# Patient Record
Sex: FEMALE | Race: WHITE
[De-identification: names, ages, dates, MRNs, and addresses within clinical notes are randomized per-mention and may not be internally consistent; named-entity substitution may affect disease eponyms.]

---

## 2017-09-11 ENCOUNTER — HOSPITAL ENCOUNTER (INPATIENT)
Dept: HOSPITAL 41 - JD.MS | Age: 67
LOS: 2 days | Discharge: HOME | DRG: 470 | End: 2017-09-13
Attending: ORTHOPAEDIC SURGERY | Admitting: ORTHOPAEDIC SURGERY
Payer: MEDICARE

## 2017-09-11 DIAGNOSIS — Z91.030: ICD-10-CM

## 2017-09-11 DIAGNOSIS — Z79.899: ICD-10-CM

## 2017-09-11 DIAGNOSIS — M17.12: Primary | ICD-10-CM

## 2017-09-11 DIAGNOSIS — G47.33: ICD-10-CM

## 2017-09-11 DIAGNOSIS — E66.9: ICD-10-CM

## 2017-09-11 DIAGNOSIS — Z79.82: ICD-10-CM

## 2017-09-11 DIAGNOSIS — E78.5: ICD-10-CM

## 2017-09-11 DIAGNOSIS — Z88.8: ICD-10-CM

## 2017-09-11 DIAGNOSIS — M81.0: ICD-10-CM

## 2017-09-11 DIAGNOSIS — I10: ICD-10-CM

## 2017-09-11 DIAGNOSIS — Z88.1: ICD-10-CM

## 2017-09-11 DIAGNOSIS — F32.9: ICD-10-CM

## 2017-09-11 PROCEDURE — G0009 ADMIN PNEUMOCOCCAL VACCINE: HCPCS

## 2017-09-11 PROCEDURE — C1776 JOINT DEVICE (IMPLANTABLE): HCPCS

## 2017-09-11 PROCEDURE — 0SRD0J9 REPLACEMENT OF LEFT KNEE JOINT WITH SYNTHETIC SUBSTITUTE, CEMENTED, OPEN APPROACH: ICD-10-PCS | Performed by: ORTHOPAEDIC SURGERY

## 2017-09-11 RX ADMIN — BUPIVACAINE HYDROCHLORIDE ONE MG: 2.5 INJECTION, SOLUTION EPIDURAL; INFILTRATION; INTRACAUDAL; PERINEURAL at 13:45

## 2017-09-11 RX ADMIN — BUPIVACAINE HYDROCHLORIDE ONE: 2.5 INJECTION, SOLUTION EPIDURAL; INFILTRATION; INTRACAUDAL; PERINEURAL at 18:21

## 2017-09-11 RX ADMIN — IODINE ONE ML: 20; 20.4; 47 LIQUID TOPICAL at 13:45

## 2017-09-11 RX ADMIN — DEXTROSE ONE GM: 5 SOLUTION INTRAVENOUS at 14:27

## 2017-09-11 RX ADMIN — BUPIVACAINE HYDROCHLORIDE ONE MG: 2.5 INJECTION, SOLUTION EPIDURAL; INFILTRATION; INTRACAUDAL; PERINEURAL at 14:26

## 2017-09-11 RX ADMIN — IODINE ONE ML: 20; 20.4; 47 LIQUID TOPICAL at 14:13

## 2017-09-11 RX ADMIN — OXYCODONE HYDROCHLORIDE AND ACETAMINOPHEN PRN TAB: 5; 325 TABLET ORAL at 21:38

## 2017-09-11 RX ADMIN — DEXTROSE ONE GM: 5 SOLUTION INTRAVENOUS at 13:46

## 2017-09-11 NOTE — PCM.PREANE
Preanesthetic Assessment





- Anesthesia/Transfusion/Family Hx


Anesthesia History: Prior Anesthesia Without Reaction


Type of Anesthesia Reaction: Excessive Nausea/Vomiting


Family History of Anesthesia Reaction: No


Transfusion History: Prior Transfusion Without Reaction





- Review of Systems


General: No Symptoms


Pulmonary: No Symptoms, Other (feels stuffy today)


Cardiovascular: No Symptoms


Gastrointestinal: No Symptoms


Neurological: No Symptoms


Other: Reports: Easy Bruising, Sinus Problem





- Physical Assessment


NPO Status Date: 09/11/17


NPO Status Time: 04:00


O2 Sat by Pulse Oximetry: 95


Respiratory Rate: 20


Vital Signs: 





 Last Vital Signs











Temp  98.3 F   09/11/17 10:50


 


Pulse  97   09/11/17 10:50


 


Resp  20   09/11/17 10:50


 


BP  149/94 H  09/11/17 10:50


 


Pulse Ox  95   09/11/17 10:50











Height: 5 ft 1 in


Weight: 102.104 kg


ASA Class: 2


Mental Status: Alert & Oriented x3


Airway Class: Mallampati = 2


Dentition: Reports: Normal Dentition


Thyro-Mental Finger Breadths: 3


Mouth Opening Finger Breadths: 3


ROM/Head Extension: Full


Lungs: Clear to Auscultation, Normal Respiratory Effort


Cardiovascular: Regular Rate, Regular Rhythm





- Lab


Values: 





 Laboratory Last Values











MRSA (PCR)  Negative   08/29/17  10:42    














- Imaging/EKG


Impressions: 





8/23/17 cxr possibly atelectasis or infiltrate right base -  mild increase in 

heart size 





8/23/17  EKG SR 65





- Allergies


Allergies/Adverse Reactions: 


 Allergies











Allergy/AdvReac Type Severity Reaction Status Date / Time


 


amlodipine [From Norvasc] Allergy  Itching Verified 09/11/17 11:34


 


amoxicillin Allergy  Stomach Verified 09/11/17 11:34





   Upset  


 


bee venom protein (honey bee) Allergy  Edema Verified 09/11/17 11:34


 


erythromycin base Allergy  Stomach Verified 09/11/17 11:34





   Upset  














- Blood


Blood Available: No





- Acknowledgements


Anesthesia Type Planned: Spinal


Pt an Appropriate Candidate for the Planned Anesthesia: Yes


Alternatives and Risks of Anesthesia Discussed w Pt/Guardian: Yes


Pt/Guardian Understands and Agrees with Anesthesia Plan: Yes





PreAnesthesia Questionnaire


HEENT History: Reports: Impaired Vision, Other (See Below)


Other HEENT History: wears glasses


Cardiovascular History: Reports: Heart Murmur, High Cholesterol, Hypertension, 

Other (See Below)


Other Cardiovascular History: mitral valve regurgitation


Respiratory History: Reports: Sleep Apnea


Gastrointestinal History: Reports: GERD


Genitourinary History: Reports: Other (See Below)


Other Genitourinary History: breast hypertrophy, UTI, frequency


OB/GYN History: Reports: None


Musculoskeletal History: Reports: Osteoarthritis, Osteoporosis, Other (See Below

)


Other Musculoskeletal History: degenerative joint disease, degenerative disc 

disease, bilateral shoulder pain, upper back pain


Endocrine/Metabolic History: Reports: None, Obesity/BMI 30+


Hematologic History: Reports: None


Immunologic History: Reports: None


Oncologic (Cancer) History: Reports: None


Dermatologic History: Reports: None





- Past Surgical History


GI Surgical History: Reports: Cholecystectomy, Colonoscopy, EGD


Female  Surgical History: Reports: Breast Biopsy


Endocrine Surgical History: Reports: None


Neurological Surgical History: Reports: None


Musculoskeletal Surgical History: Reports: Other (See Below)


Other Musculoskeletal Surgeries/Procedures:: right total knee, right foot 

fusion with hardware


Oncologic Surgical History: Reports: None





- SUBSTANCE USE


Smoking Status *Q: Never Smoker


Tobacco Use Within Last Twelve Months: No


Second Hand Smoke Exposure: No


Days Per Week of Alcohol Use: 0 (1-2 per month)


Recreational Drug Use History: No





- HOME MEDS


Home Medications: 


 Home Meds





Aspirin/Acetaminophen/Caffeine [Migraine Relief Caplet] 2 tab PO Q6H PRN 09/08/ 17 [History]


Calcium Carbonate [Calcium] 1,200 mg PO DAILY 09/08/17 [History]


Celecoxib 200 mg PO DAILY PRN 09/08/17 [History]


Hydrochlorothiazide [Hydrochlorothiazide] 12.5 mg PO DAILY 09/08/17 [History]


Multivitamin [Daily Kinsey] 1 tab PO DAILY 09/08/17 [History]


Naproxen Sodium [Aleve] 220 mg PO BID 09/08/17 [History]


Omeprazole Magnesium [Prilosec Otc] 20 mg PO DAILY 09/08/17 [History]


Simvastatin [Zocor] 20 mg PO BEDTIME 09/08/17 [History]


traMADol HCl [Tramadol HCl] 50 mg PO QID PRN 09/08/17 [History]


Cholecalciferol (Vitamin D3) [Vitamin D] 1 tab PO DAILY 09/11/17 [History]


Vit C/Kinsey Ac/Lut/Copper/ZnOx [Preservision Lutein Softgel] 1 cap PO BID 09/11/ 17 [History]











- CURRENT (IN HOUSE) MEDS


Current Meds: 





 Current Medications





Aspirin (Ecotrin)  325 mg PO BID [facility]


Bisacodyl (Dulcolax)  5 mg PO DAILY PRN


   PRN Reason: Constipation


Morphine Sulfate 8 mg/Epinephrine HCl 0.3 mg/Cefuroxime Sodium 750 mg/Ketorolac 

Tromethamine 30 mg/Sodium Chloride 27.9 ml  0 mg .XX ONETIME ONE


   Stop: 09/11/17 12:01


Cyclobenzaprine HCl (Flexeril)  10 mg PO TID PRN


   PRN Reason: Spasms


Diphenhydramine HCl (Benadryl)  25 mg IVPUSH Q4H PRN


   PRN Reason: Nausea


Docusate Sodium (Colace)  100 mg PO BID [facility]


Famotidine (Pepcid)  20 mg PO Q12H [facility]


Lactated Ringer's (Ringers, Lactated)  1,000 mls @ 125 mls/hr IV ASDIRECTED [facility]


   Last Admin: 09/11/17 11:10 Dose:  125 mls/hr


Cefazolin Sodium/Dextrose 2 gm (/ Premix)  50 mls @ 100 mls/hr IV Q8H [facility]


   Stop: 09/11/17 23:59


Ketorolac Tromethamine (Toradol)  15 mg IVPUSH Q8H PRN


   PRN Reason: Pain


Lidocaine/Sodium Bicarbonate (Buffered Lidocaine 1% In Ns 8.4%)  0.25 ml IV 

ONETIME PRN


   PRN Reason: Prior to IV Start


   Stop: 09/11/17 23:00


   Last Admin: 09/11/17 11:09 Dose:  0.25 ml


Magnesium Hydroxide (Milk Of Magnesia)  30 ml PO BID PRN


   PRN Reason: Constipation


Morphine Sulfate (Morphine)  2 mg IVPUSH Q2H PRN


   PRN Reason: Breakthrough Pain


Naloxone HCl (Narcan)  0.1 mg IVPUSH Q5M PRN


   PRN Reason: Oversedation


Ondansetron HCl (Zofran)  4 mg IVPUSH Q6H PRN


   PRN Reason: Nausea/Vomiting


Oxycodone/Acetaminophen (Percocet 325-5 Mg)  1 - 2 tab PO Q4H PRN


   PRN Reason: Pain


Senna (Senna)  8.6 mg PO BID PRN


   PRN Reason: Constipation


Sodium Chloride (Saline Flush)  10 ml FLUSH ASDIRECTED PRN


   PRN Reason: Keep Vein Open


   Stop: 09/11/17 23:00





Discontinued Medications





Cefazolin Sodium (Ancef) Confirm Administered Dose 2 gm .ROUTE .STK-MED ONE


   Stop: 09/11/17 11:23


Ephedrine Sulfate (Ephedrine Sulfate) Confirm Administered Dose 50 mg .ROUTE 

.STK-MED ONE


   Stop: 09/11/17 11:23


Fentanyl (Sublimaze) Confirm Administered Dose 100 mcg .ROUTE .STK-MED ONE


   Stop: 09/11/17 11:24


Lidocaine HCl (Xylocaine-Mpf 1%) Confirm Administered Dose 4 mls @ as directed 

.ROUTE .STK-MED ONE


   Stop: 09/11/17 11:23


Midazolam HCl (Versed 1 Mg/Ml) Confirm Administered Dose 2 mg .ROUTE .STK-MED 

ONE


   Stop: 09/11/17 11:24


Morphine Sulfate (Duramorph Pf) Confirm Administered Dose 10 mg .ROUTE .STK-MED 

ONE


   Stop: 09/11/17 11:24


Propofol (Diprivan  20 Ml) Confirm Administered Dose 400 mg .ROUTE .STK-MED ONE


   Stop: 09/11/17 11:24

## 2017-09-11 NOTE — PCM.POSTAN
POST ANESTHESIA ASSESSMENT





- MENTAL STATUS


Mental Status: Alert, Oriented





- VITAL SIGNS


Pulse Rate: 93


SaO2: 93


Resp Rate: 12


Blood Pressure: 121/71


Temperature: 98.2 C





- RESPIRATORY


Respiratory Status: Respiratory Rate WNL, Airway Patent, O2 Saturation Stable, 

Supplemental Oxygen





- CARDIOVASCULAR


CV Status: Pulse Rate WNL, Blood Pressure Stable





- GASTROINTESTINAL


GI Status: No Symptoms





- PAIN


Pain Score: 0





- POST OP HYDRATION


Hydration Status: Adequate & Stable

## 2017-09-11 NOTE — PCM.CONSN
- General Info


Date of Service: 09/11/17


Subjective Update: 











67 year old female with left knee OA presents post op.  She is S/P left total 

knee arthroplasty.  The hospitalist service has been consulted to assist with 

medical management.


Functional Status: Reports: Pain Controlled, Tolerating Diet, Ambulating





- Review of Systems


General: Reports: No Symptoms


HEENT: Reports: No Symptoms


Pulmonary: Reports: No Symptoms


Cardiovascular: Reports: No Symptoms


Gastrointestinal: Reports: No Symptoms


Genitourinary: Reports: No Symptoms


Musculoskeletal: Reports: No Symptoms


Skin: Reports: No Symptoms


Neurological: Reports: No Symptoms


Psychiatric: Reports: No Symptoms





- Patient Data


Vitals - Most Recent: 


 Last Vital Signs











Temp  36.6 C   09/11/17 18:00


 


Pulse  88   09/11/17 19:05


 


Resp  16   09/11/17 19:05


 


BP  120/75   09/11/17 19:05


 


Pulse Ox  92 L  09/11/17 19:05











Weight - Most Recent: 102.104 kg


I&O - Last 24 Hours: 


 Intake & Output











 09/11/17 09/11/17 09/11/17





 06:59 14:59 22:59


 


Intake Total   340


 


Output Total   500


 


Balance   -160











Med Orders - Current: 


 Current Medications





Aspirin (Ecotrin)  325 mg PO BID [facility]


Bisacodyl (Dulcolax)  5 mg PO DAILY PRN


   PRN Reason: Constipation


Calcium Carbonate/Glycine (Calcium Carbonate)  1,200 mg PO DAILY [facility]


Cholecalciferol (Vitamin D3)  5,000 units PO DAILY [facility]


Cyclobenzaprine HCl (Flexeril)  10 mg PO TID PRN


   PRN Reason: Spasms


Docusate Sodium (Colace)  100 mg PO BID [facility]


Hydrochlorothiazide (Hydrochlorothiazide)  12.5 mg PO DAILY [facility]


Cefazolin Sodium/Dextrose 2 gm (/ Premix)  50 mls @ 100 mls/hr IV Q8H [facility]


   Stop: 09/12/17 13:29


Ketorolac Tromethamine (Toradol)  15 mg IVPUSH Q8H PRN


   PRN Reason: Pain


Magnesium Hydroxide (Milk Of Magnesia)  30 ml PO BID PRN


   PRN Reason: Constipation


Miscellaneous Information (Remove Patch)  0 ea TRDERM ONETIME ONE


   Stop: 09/14/17 12:31


Morphine Sulfate (Morphine)  2 mg IVPUSH Q2H PRN


   PRN Reason: Breakthrough Pain


Naloxone HCl (Narcan)  0.1 mg IVPUSH Q5M PRN


   PRN Reason: Oversedation


Ondansetron HCl (Zofran)  4 mg IVPUSH Q6H PRN


   PRN Reason: Nausea/Vomiting


Oxycodone/Acetaminophen (Percocet 325-5 Mg)  1 - 2 tab PO Q4H PRN


   PRN Reason: Pain


Pantoprazole Sodium (Protonix***)  40 mg PO DAILY [facility]


Senna (Senna)  8.6 mg PO BID PRN


   PRN Reason: Constipation


Simvastatin (Zocor)  20 mg PO BEDTIME [facility]


Sodium Chloride (Saline Flush)  10 ml FLUSH ASDIRECTED PRN


   PRN Reason: Keep Vein Open


   Stop: 09/11/17 23:00





Discontinued Medications





Bupivacaine HCl (Marcaine 0.25%) Confirm Administered Dose 30 ml .ROUTE .STK-

MED ONE


   Stop: 09/11/17 12:20


   Last Admin: 09/11/17 14:32 Dose:  30 ml


Cefazolin Sodium (Ancef) Confirm Administered Dose 2 gm .ROUTE .STK-MED ONE


   Stop: 09/11/17 11:23


   Last Admin: 09/11/17 14:17 Dose:  2 gm


Cefazolin Sodium (Ancef) Confirm Administered Dose 2 gm .ROUTE .STK-MED ONE


   Stop: 09/11/17 12:20


Morphine Sulfate 8 mg/Epinephrine HCl 0.3 mg/Cefuroxime Sodium 750 mg/Ketorolac 

Tromethamine 30 mg/Sodium Chloride 27.9 ml  0 mg .XX ONETIME ONE


   Stop: 09/11/17 12:01


   Last Admin: 09/11/17 18:21 Dose:  Not Given


Diphenhydramine HCl (Benadryl)  25 mg IVPUSH Q4H PRN


   PRN Reason: Nausea


Diphenhydramine HCl (Benadryl)  25 mg IVPUSH Q6H PRN


   PRN Reason: Pruritis


   Stop: 09/11/17 18:00


Ephedrine Sulfate (Ephedrine Sulfate) Confirm Administered Dose 50 mg .ROUTE 

.STK-MED ONE


   Stop: 09/11/17 11:23


Famotidine (Pepcid)  20 mg PO Q12H [facility]


Famotidine (Pepcid) Confirm Administered Dose 20 mg .ROUTE .STK-MED ONE


   Stop: 09/11/17 12:15


Fentanyl (Sublimaze) Confirm Administered Dose 100 mcg .ROUTE .STK-MED ONE


   Stop: 09/11/17 11:24


Haloperidol Lactate (Haldol)  1 mg IVPUSH ONETIME ONE


   Stop: 09/11/17 14:16


   Last Admin: 09/11/17 18:56 Dose:  Not Given


Lactated Ringer's (Ringers, Lactated)  1,000 mls @ 125 mls/hr IV ASDIRECTED [facility]


   Last Admin: 09/11/17 11:10 Dose:  125 mls/hr


Lidocaine HCl (Xylocaine-Mpf 1%) Confirm Administered Dose 4 mls @ as directed 

.ROUTE .STK-MED ONE


   Stop: 09/11/17 11:23


Iodine (Iodine 2% Mild Tincture) Confirm Administered Dose 30 ml .ROUTE .STK-

MED ONE


   Stop: 09/11/17 12:20


   Last Admin: 09/11/17 14:13 Dose:  18 ml


Lidocaine/Sodium Bicarbonate (Buffered Lidocaine 1% In Ns 8.4%)  0.25 ml IV 

ONETIME PRN


   PRN Reason: Prior to IV Start


   Stop: 09/11/17 23:00


   Last Admin: 09/11/17 11:09 Dose:  0.25 ml


Midazolam HCl (Versed 1 Mg/Ml) Confirm Administered Dose 2 mg .ROUTE .STK-MED 

ONE


   Stop: 09/11/17 11:24


Midazolam HCl (Versed 1 Mg/Ml) Confirm Administered Dose 2 mg .ROUTE .STK-MED 

ONE


   Stop: 09/11/17 13:01


Morphine Sulfate (Duramorph Pf) Confirm Administered Dose 10 mg .ROUTE .STK-MED 

ONE


   Stop: 09/11/17 11:24


Ondansetron HCl (Zofran)  4 mg IVPUSH ONETIME PRN


   PRN Reason: Nausea/Vomiting


   Stop: 09/11/17 18:00


Propofol (Diprivan  20 Ml) Confirm Administered Dose 400 mg .ROUTE .STK-MED ONE


   Stop: 09/11/17 11:24


Propofol (Diprivan  20 Ml) Confirm Administered Dose 400 mg .ROUTE .STK-MED ONE


   Stop: 09/11/17 14:10


Scopolamine (Transderm-Scop)  1.5 mg TRDERM ONETIME ONE


   Stop: 09/11/17 12:31


   Last Admin: 09/11/17 12:30 Dose:  1.5 mg


Tranexamic Acid (Cyklokapron) Confirm Administered Dose 1,000 mg .ROUTE .STK-

MED ONE


   Stop: 09/11/17 12:20


   Last Admin: 09/11/17 14:31 Dose:  1,000 mg


Vancomycin HCl (Vancomycin) Confirm Administered Dose 1 gm .ROUTE .STK-MED ONE


   Stop: 09/11/17 12:20


   Last Admin: 09/11/17 14:27 Dose:  1 gm











- Exam


Quality Assessment: Supplemental Oxygen, Urine Catheter, DVT Prophylaxis


General: Alert, Oriented, Cooperative, No Acute Distress


HEENT: Pupils Equal, Pupils Reactive, EOMI


Neck: Supple, Trachea Midline, No JVD


Lungs: Normal Respiratory Effort


Cardiovascular: Regular Rate, Regular Rhythm, Murmurs (SM at apex)


GI/Abdominal Exam: Normal Bowel Sounds, Soft, Non-Tender, No Distention


 (Female) Exam: Deferred


Back Exam: Normal Inspection


Extremities: Normal Inspection, Normal Capillary Refill


Skin: Warm


Wound/Incisions: Dressing Dry and Intact


Neurological: No New Focal Deficit


Psy/Mental Status: Alert, Normal Affect, Normal Mood





Consult PN Assessment/Plan


POD#: 0


Procedures: 


Procedures





BONE IMAGING 3 PHASE (07/13/17)


MICROBE SUSCEPTIBLE SUAD (06/03/17)


URINE BACTERIA CULTURE (06/03/17)


URINE CULTURE/COLONY COUNT (06/03/17)


X-RAY EXAM OF WRIST (06/04/14)








Problem List Initiated/Reviewed/Updated: Yes


Plan: 











Impression:





Left knee arthroplasty, POD 0

















Chronic


Depression


OA


HLD


MR


Sleep apnea

















Plan:





Home meds


Daily labs


Pain/DVT medical mgt per primary service


CM/PT/OT

## 2017-09-12 RX ADMIN — OXYCODONE HYDROCHLORIDE AND ACETAMINOPHEN PRN TAB: 5; 325 TABLET ORAL at 22:32

## 2017-09-12 RX ADMIN — VITAMIN D, TAB 1000IU (100/BT) SCH UNITS: 25 TAB at 08:21

## 2017-09-12 RX ADMIN — OXYCODONE HYDROCHLORIDE AND ACETAMINOPHEN PRN TAB: 5; 325 TABLET ORAL at 18:48

## 2017-09-12 RX ADMIN — OXYCODONE HYDROCHLORIDE AND ACETAMINOPHEN PRN TAB: 5; 325 TABLET ORAL at 03:59

## 2017-09-12 RX ADMIN — KETOROLAC TROMETHAMINE PRN MG: 15 INJECTION, SOLUTION INTRAMUSCULAR; INTRAVENOUS at 21:57

## 2017-09-12 RX ADMIN — OXYCODONE HYDROCHLORIDE AND ACETAMINOPHEN PRN TAB: 5; 325 TABLET ORAL at 12:57

## 2017-09-12 RX ADMIN — ASPIRIN SCH MG: 325 TABLET, DELAYED RELEASE ORAL at 08:22

## 2017-09-12 RX ADMIN — ASPIRIN SCH MG: 325 TABLET, DELAYED RELEASE ORAL at 21:58

## 2017-09-12 RX ADMIN — OXYCODONE HYDROCHLORIDE AND ACETAMINOPHEN PRN TAB: 5; 325 TABLET ORAL at 08:23

## 2017-09-12 RX ADMIN — SODIUM CHLORIDE PRN MG: 9 INJECTION, SOLUTION INTRAVENOUS at 12:08

## 2017-09-12 RX ADMIN — KETOROLAC TROMETHAMINE PRN MG: 15 INJECTION, SOLUTION INTRAMUSCULAR; INTRAVENOUS at 08:24

## 2017-09-12 NOTE — [id number]
- General Info


Date of Service: 09/12/17


POD#: 1


Functional Status: Reports: Tolerating Diet, Ambulating, Urinating, Other (The 

pt has had low O2 sats intermittently today.  She has had nausea.)





- Review of Systems


Musculoskeletal: Reports: Other (The pt is progressing with therapies.)





- Patient Data


Vitals - Most Recent: 


 Last Vital Signs











Temp  98.4 F   09/12/17 12:09


 


Pulse  72   09/12/17 12:09


 


Resp  16   09/12/17 15:00


 


BP  114/66   09/12/17 12:09


 


Pulse Ox  97   09/12/17 15:00











Weight - Most Recent: 224 lb


I&O - Last 24 Hours: 


 Intake & Output











 09/12/17 09/12/17 09/12/17





 06:59 14:59 22:59


 


Intake Total 1300 120 430


 


Output Total 575  


 


Balance 725 120 430











Lab Results Last 24 Hrs: 


 Laboratory Results - last 24 hr











  09/12/17 09/12/17 Range/Units





  06:01 06:01 


 


WBC  12.33 H   (3.98-10.04)  K/mm3


 


RBC  3.43 L   (3.98-5.22)  M/mm3


 


Hgb  10.3 L   (11.2-15.7)  gm/L


 


Hct  32.1 L   (34.1-44.9)  %


 


MCV  93.6   (79.4-94.8)  fl


 


MCH  30.0   (25.6-32.2)  pg


 


MCHC  32.1 L   (32.2-35.5)  g/dl


 


RDW Std Deviation  45.5   (36.4-46.3)  fL


 


Plt Count  211   (182-369)  K/mm3


 


MPV  12.0   (9.4-12.3)  fl


 


Sodium   139  (136-145)  mEq/L


 


Potassium   4.1  (3.5-5.1)  mEq/L


 


Chloride   103  ()  mEq/L


 


Carbon Dioxide   29  (21-32)  mEq/L


 


Anion Gap   11.1  (5-15)  


 


BUN   13  (7-18)  mg/dL


 


Creatinine   0.8  (0.55-1.02)  mg/dL


 


Est Cr Clr Drug Dosing   50.79  mL/min


 


Estimated GFR (MDRD)   > 60  (>60)  mL/min


 


BUN/Creatinine Ratio   16.3  (14-18)  


 


Glucose   118 H  ()  mg/dL


 


Calcium   8.0 L  (8.5-10.1)  mg/dL


 


Total Bilirubin   0.9  (0.2-1.0)  mg/dL


 


AST   21  (15-37)  U/L


 


ALT   24  (14-59)  U/L


 


Alkaline Phosphatase   53  ()  U/L


 


Total Protein   5.9 L  (6.4-8.2)  g/dl


 


Albumin   2.8 L  (3.4-5.0)  g/dl


 


Globulin   3.1  gm/dL


 


Albumin/Globulin Ratio   0.9 L  (1-2)  











Med Orders - Current: 


 Current Medications





Aspirin (Ecotrin)  325 mg PO BID [facility]


   Last Admin: 09/12/17 08:22 Dose:  325 mg


Bisacodyl (Dulcolax)  5 mg PO DAILY PRN


   PRN Reason: Constipation


Calcium Carbonate/Glycine (Calcium Carbonate)  1,200 mg PO DAILY [facility]


   Last Admin: 09/12/17 08:22 Dose:  600 mg


Cholecalciferol (Vitamin D3)  5,000 units PO DAILY [facility]


   Last Admin: 09/12/17 08:21 Dose:  5,000 units


Cyclobenzaprine HCl (Flexeril)  10 mg PO TID PRN


   PRN Reason: Spasms


Docusate Sodium (Colace)  100 mg PO BID [facility]


   Last Admin: 09/12/17 08:23 Dose:  100 mg


Hydrochlorothiazide (Hydrochlorothiazide)  12.5 mg PO DAILY [facility]


   Last Admin: 09/12/17 08:22 Dose:  12.5 mg


Ketorolac Tromethamine (Toradol)  15 mg IVPUSH Q8H PRN


   PRN Reason: Pain


   Last Admin: 09/12/17 08:24 Dose:  15 mg


Magnesium Hydroxide (Milk Of Magnesia)  30 ml PO BID PRN


   PRN Reason: Constipation


Miscellaneous Information (Remove Patch)  0 ea TRDERM ONETIME ONE


   Stop: 09/14/17 12:31


Morphine Sulfate (Morphine)  2 mg IVPUSH Q2H PRN


   PRN Reason: Breakthrough Pain


   Last Admin: 09/12/17 16:16 Dose:  2 mg


Naloxone HCl (Narcan)  0.1 mg IVPUSH Q5M PRN


   PRN Reason: Oversedation


Ondansetron HCl (Zofran)  4 mg IVPUSH Q6H PRN


   PRN Reason: Nausea/Vomiting


   Last Admin: 09/12/17 12:08 Dose:  4 mg


Oxycodone/Acetaminophen (Percocet 325-5 Mg)  1 - 2 tab PO Q4H PRN


   PRN Reason: Pain


   Last Admin: 09/12/17 12:57 Dose:  2 tab


Pantoprazole Sodium (Protonix***)  40 mg PO DAILY [facility]


   Last Admin: 09/12/17 08:22 Dose:  40 mg


Senna (Senna)  8.6 mg PO BID PRN


   PRN Reason: Constipation


Simvastatin (Zocor)  20 mg PO BEDTIME [facility]


   Last Admin: 09/11/17 20:05 Dose:  20 mg





Discontinued Medications





Bupivacaine HCl (Marcaine 0.25%) Confirm Administered Dose 30 ml .ROUTE .STK-

MED ONE


   Stop: 09/11/17 12:20


   Last Admin: 09/11/17 14:32 Dose:  30 ml


Cefazolin Sodium (Ancef) Confirm Administered Dose 2 gm .ROUTE .STK-MED ONE


   Stop: 09/11/17 11:23


   Last Admin: 09/11/17 14:17 Dose:  2 gm


Cefazolin Sodium (Ancef) Confirm Administered Dose 2 gm .ROUTE .STK-MED ONE


   Stop: 09/11/17 12:20


Morphine Sulfate 8 mg/Epinephrine HCl 0.3 mg/Cefuroxime Sodium 750 mg/Ketorolac 

Tromethamine 30 mg/Sodium Chloride 27.9 ml  0 mg .XX ONETIME ONE


   Stop: 09/11/17 12:01


   Last Admin: 09/11/17 18:21 Dose:  Not Given


Diphenhydramine HCl (Benadryl)  25 mg IVPUSH Q4H PRN


   PRN Reason: Nausea


Diphenhydramine HCl (Benadryl)  25 mg IVPUSH Q6H PRN


   PRN Reason: Pruritis


   Stop: 09/11/17 18:00


Diphenhydramine HCl (Benadryl)  25 mg PO ONETIME ONE


   Stop: 09/11/17 22:31


   Last Admin: 09/11/17 22:39 Dose:  25 mg


Ephedrine Sulfate (Ephedrine Sulfate) Confirm Administered Dose 50 mg .ROUTE 

.STK-MED ONE


   Stop: 09/11/17 11:23


Famotidine (Pepcid)  20 mg PO Q12H [facility]


Famotidine (Pepcid) Confirm Administered Dose 20 mg .ROUTE .STK-MED ONE


   Stop: 09/11/17 12:15


Fentanyl (Sublimaze) Confirm Administered Dose 100 mcg .ROUTE .STK-MED ONE


   Stop: 09/11/17 11:24


Haloperidol Lactate (Haldol)  1 mg IVPUSH ONETIME ONE


   Stop: 09/11/17 14:16


   Last Admin: 09/11/17 18:56 Dose:  Not Given


Lactated Ringer's (Ringers, Lactated)  1,000 mls @ 125 mls/hr IV ASDIRECTED [facility]


   Last Admin: 09/11/17 11:10 Dose:  125 mls/hr


Cefazolin Sodium/Dextrose 2 gm (/ Premix)  50 mls @ 100 mls/hr IV Q8H [facility]


   Stop: 09/12/17 13:29


   Last Admin: 09/12/17 12:08 Dose:  100 mls/hr


Lidocaine HCl (Xylocaine-Mpf 1%) Confirm Administered Dose 4 mls @ as directed 

.ROUTE .STK-MED ONE


   Stop: 09/11/17 11:23


Iodine (Iodine 2% Mild Tincture) Confirm Administered Dose 30 ml .ROUTE .STK-

MED ONE


   Stop: 09/11/17 12:20


   Last Admin: 09/11/17 14:13 Dose:  18 ml


Lidocaine/Sodium Bicarbonate (Buffered Lidocaine 1% In Ns 8.4%)  0.25 ml IV 

ONETIME PRN


   PRN Reason: Prior to IV Start


   Stop: 09/11/17 23:00


   Last Admin: 09/11/17 11:09 Dose:  0.25 ml


Midazolam HCl (Versed 1 Mg/Ml) Confirm Administered Dose 2 mg .ROUTE .STK-MED 

ONE


   Stop: 09/11/17 11:24


Midazolam HCl (Versed 1 Mg/Ml) Confirm Administered Dose 2 mg .ROUTE .STK-MED 

ONE


   Stop: 09/11/17 13:01


Morphine Sulfate (Duramorph Pf) Confirm Administered Dose 10 mg .ROUTE .STK-MED 

ONE


   Stop: 09/11/17 11:24


Ondansetron HCl (Zofran)  4 mg IVPUSH ONETIME PRN


   PRN Reason: Nausea/Vomiting


   Stop: 09/11/17 18:00


Propofol (Diprivan  20 Ml) Confirm Administered Dose 400 mg .ROUTE .STK-MED ONE


   Stop: 09/11/17 11:24


Propofol (Diprivan  20 Ml) Confirm Administered Dose 400 mg .ROUTE .STK-MED ONE


   Stop: 09/11/17 14:10


Scopolamine (Transderm-Scop)  1.5 mg TRDERM ONETIME ONE


   Stop: 09/11/17 12:31


   Last Admin: 09/11/17 12:30 Dose:  1.5 mg


Sodium Chloride (Saline Flush)  10 ml FLUSH ASDIRECTED PRN


   PRN Reason: Keep Vein Open


   Stop: 09/11/17 23:00


Tranexamic Acid (Cyklokapron) Confirm Administered Dose 1,000 mg .ROUTE .STK-

MED ONE


   Stop: 09/11/17 12:20


   Last Admin: 09/11/17 14:31 Dose:  1,000 mg


Vancomycin HCl (Vancomycin) Confirm Administered Dose 1 gm .ROUTE .STK-MED ONE


   Stop: 09/11/17 12:20


   Last Admin: 09/11/17 14:27 Dose:  1 gm











- Exam


Wound/Incisions: Dressing Dry and Intact


General: Alert, Cooperative, No Acute Distress


Lungs: Normal Respiratory Effort, Other (O2 per NC)


Extremities: Other (NVS intact for BLE.  Andrea's negative.  )





- Problem List Review


Problem List Initiated/Reviewed/Updated: Yes





- My Orders


Last 24 Hours: 


 Active Orders 24 hr











 Category Date Time Status


 


 Acapella [RT Chest Physiotherapy] [RC] ASDIRECTED Care  09/12/17 09:09 Active


 


 Evaluate for Home Oxygen [RT Evaluate for Home Oxygen] Care  09/12/17 11:00 

Active





 [RC] Click to Edit   


 


 Regular Diet [DIET] Diet  09/11/17 Dinner Active


 


 Aspirin [Ecotrin] Med  09/12/17 09:00 Active





 325 mg PO BID   


 


 Calcium Carbonate Med  09/12/17 09:00 Active





 1,200 mg PO DAILY   


 


 Cholecalciferol (Vitamin D3) [Vitamin D3] Med  09/12/17 09:00 Active





 5,000 units PO DAILY   


 


 Docusate Sodium [Colace] Med  09/11/17 21:00 Active





 100 mg PO BID   


 


 Hydrochlorothiazide Med  09/12/17 09:00 Active





 12.5 mg PO DAILY   


 


 Pantoprazole [ProTONIX***] Med  09/12/17 09:00 Active





 40 mg PO DAILY   


 


 Remove Patch Med  09/14/17 12:30 Once





 0 ea TRDERM ONETIME ONE   


 


 Simvastatin [Zocor] Med  09/11/17 21:00 Active





 20 mg PO BEDTIME   








 Medication Orders





Aspirin (Ecotrin)  325 mg PO BID [facility]


   Last Admin: 09/12/17 08:22  Dose: 325 mg


Bisacodyl (Dulcolax)  5 mg PO DAILY PRN


   PRN Reason: Constipation


Calcium Carbonate/Glycine (Calcium Carbonate)  1,200 mg PO DAILY [facility]


   Last Admin: 09/12/17 08:22  Dose: 600 mg


Cholecalciferol (Vitamin D3)  5,000 units PO DAILY [facility]


   Last Admin: 09/12/17 08:21  Dose: 5,000 units


Cyclobenzaprine HCl (Flexeril)  10 mg PO TID PRN


   PRN Reason: Spasms


Docusate Sodium (Colace)  100 mg PO BID [facility]


   Last Admin: 09/12/17 08:23  Dose: 100 mg


   Admin: 09/11/17 20:05  Dose: 100 mg


Hydrochlorothiazide (Hydrochlorothiazide)  12.5 mg PO DAILY [facility]


   Last Admin: 09/12/17 08:22  Dose: 12.5 mg


Ketorolac Tromethamine (Toradol)  15 mg IVPUSH Q8H PRN


   PRN Reason: Pain


   Last Admin: 09/12/17 08:24  Dose: 15 mg


Magnesium Hydroxide (Milk Of Magnesia)  30 ml PO BID PRN


   PRN Reason: Constipation


Miscellaneous Information (Remove Patch)  0 ea TRDERM ONETIME ONE


   Stop: 09/14/17 12:31


Morphine Sulfate (Morphine)  2 mg IVPUSH Q2H PRN


   PRN Reason: Breakthrough Pain


   Last Admin: 09/12/17 16:16  Dose: 2 mg


Naloxone HCl (Narcan)  0.1 mg IVPUSH Q5M PRN


   PRN Reason: Oversedation


Ondansetron HCl (Zofran)  4 mg IVPUSH Q6H PRN


   PRN Reason: Nausea/Vomiting


   Last Admin: 09/12/17 12:08  Dose: 4 mg


Oxycodone/Acetaminophen (Percocet 325-5 Mg)  1 - 2 tab PO Q4H PRN


   PRN Reason: Pain


   Last Admin: 09/12/17 12:57  Dose: 2 tab


   Admin: 09/12/17 08:23  Dose: 2 tab


   Admin: 09/12/17 03:59  Dose: 2 tab


   Admin: 09/11/17 21:38  Dose: 2 tab


Pantoprazole Sodium (Protonix***)  40 mg PO DAILY [facility]


   Last Admin: 09/12/17 08:22  Dose: 40 mg


Senna (Senna)  8.6 mg PO BID PRN


   PRN Reason: Constipation


Simvastatin (Zocor)  20 mg PO BEDTIME [facility]


   Last Admin: 09/11/17 20:05  Dose: 20 mg











- Assessment


Assessment           (Free Text/Narrative):: 





POD#1 - left TKA





- Plan


Plan                        (Free Text/Narrative):: 





1.  The pt will remain in Hospital overnight for continued monitoring of oxygen 

and to try to wean from O2 use.


2.  The pt has not been cleared medically for discharge today.


3.  Hgb 10.3 today.


4.  ASA 325mg PO BID, frequent mobility, SCDs, TEDs for VTE prophylaxis.





The pt's case was discussed with Dr. Perales today.

## 2017-09-12 NOTE — [id number]
Post Anesthesia Note





- EVALUATION WITHIN 48HRS OF ANESTHETIC


Vital Signs in Normal Range: Yes


Patient Participated in Evaluation: Yes


Respiratory Function Stable: Yes


Airway Patent: Yes


Cardiovascular Function Stable: Yes


Hydration Status Stable: Yes


Pain Control Satisfactory: No


Nausea and Vomiting Control Satisfactory: Yes


Mental Status Recovered: Yes





- COMMENTS/OBSERVATIONS


Free Text/Narrative:: 





Patient denied any residual numbness or tingling to lower extremities, headache

, or back pain.  Patient also said she didn't have any nausea but did complain 

of pain to her left knee.  Pt is up ambulating and resting in her chair.

## 2017-09-12 NOTE — PCM.SN
- Free Text/Narrative


Note: 


Patient was cleared for discharge from an Ortho standpoint. Her oxygen 

saturations have fluctuated quite a bit today while in our care. I was in to 

see her this AM after nursing attempted to wean pt. off O2. Her saturations 

dropped to middle 80's with good pleth on continuous pulse ox. PT/OT reports 

concerns with patient as she was de-saturating for them while they were 

evaluating her. There were some concerns over her saturations by nursing as well

, although her documented saturations appear to reflect baseline. I personally 

went in to evaluate patient. O2 was stopped, as she was showing 97% saturation 

on the monitor with good pleth at 1L via NC. Laying in bed, not moving or 

talking, she dropped to low 90's, fluctuating between 90-93. While talking she 

de-saturated to 85-87 range. She has a diagnosis of MILI, but no other 

respiratory conditions. She is obese. She has never been a smoker. Her 

saturations eventually returned to low 90's sitting around 92%, however they do 

fluctuate occasionally. I discussed these findings with the patient. She voices 

concern, as she reportedly was hospitalized for an extended period of time 

following a prior surgery. We discussed exploring home oxygen therapy if needed 

and she would rather not be discharged on home O2, if at all possible. Case 

management reports pt. does not have a diagnosis that would authorize home 

oxygen equipment. We will explore this thoroughly if it comes to this.  RT will 

attempt to qualify her for home O2 tonight so we can be prepared and determine 

need. At this point I do not think Mrs. Kidd is medically ready to be 

discharged. We will evaluate her thoroughly tomorrow utilizing nursing, along 

with PT/OT, and plan on discharge at that point.

## 2017-09-12 NOTE — CR
Left knee: Two views of the left knee were obtained.

 

Comparison: No prior left knee study.

 

Knee prosthesis is seen.  Components are aligned.  Soft tissue air is 

noted from the surgical procedure.  Bony structures are unremarkable.

 

Impression:

1.  Satisfactory postop radiographic appearance of recently placed 

left knee prosthesis.

 

Diagnostic code #2

## 2017-09-12 NOTE — PCM.CONSN
- General Info


Date of Service: 09/12/17


Admission Dx/Problem (Free Text): 


S/P left knee arthroplasty 





Subjective Update: 


67 year old female with left knee OA presents post op.  She is day 1 S/P left 

total knee arthroplasty.  The hospitalist service has been consulted to assist 

with medical management.


Functional Status: Reports: Pain Controlled, Tolerating Diet, Ambulating, 

Incentive Spirometry


Pain Score: 3





- Review of Systems


General: Reports: No Symptoms


HEENT: Reports: No Symptoms


Pulmonary: Reports: Cough (since surgery ).  Denies: Shortness of Breath, 

Pleuritic Chest Pain, Sputum, Wheezing


Cardiovascular: Reports: No Symptoms


Gastrointestinal: Reports: No Symptoms


Genitourinary: Reports: No Symptoms


Musculoskeletal: Reports: Leg Pain (left leg from knee to hip)


Skin: Reports: No Symptoms


Neurological: Reports: No Symptoms


Psychiatric: Reports: No Symptoms





- Patient Data


Vitals - Most Recent: 


 Last Vital Signs











Temp  98.6 F   09/12/17 08:14


 


Pulse  71   09/12/17 08:14


 


Resp  17   09/12/17 09:00


 


BP  102/67   09/12/17 08:14


 


Pulse Ox  97   09/12/17 07:50











Weight - Most Recent: 224 lb


I&O - Last 24 Hours: 


 Intake & Output











 09/11/17 09/12/17 09/12/17





 22:59 06:59 14:59


 


Intake Total 1350 1300 


 


Output Total 600 575 


 


Balance 750 725 











Lab Results Last 24 Hours: 


 Laboratory Results - last 24 hr











  09/12/17 09/12/17 Range/Units





  06:01 06:01 


 


WBC  12.33 H   (3.98-10.04)  K/mm3


 


RBC  3.43 L   (3.98-5.22)  M/mm3


 


Hgb  10.3 L   (11.2-15.7)  gm/L


 


Hct  32.1 L   (34.1-44.9)  %


 


MCV  93.6   (79.4-94.8)  fl


 


MCH  30.0   (25.6-32.2)  pg


 


MCHC  32.1 L   (32.2-35.5)  g/dl


 


RDW Std Deviation  45.5   (36.4-46.3)  fL


 


Plt Count  211   (182-369)  K/mm3


 


MPV  12.0   (9.4-12.3)  fl


 


Sodium   139  (136-145)  mEq/L


 


Potassium   4.1  (3.5-5.1)  mEq/L


 


Chloride   103  ()  mEq/L


 


Carbon Dioxide   29  (21-32)  mEq/L


 


Anion Gap   11.1  (5-15)  


 


BUN   13  (7-18)  mg/dL


 


Creatinine   0.8  (0.55-1.02)  mg/dL


 


Est Cr Clr Drug Dosing   50.79  mL/min


 


Estimated GFR (MDRD)   > 60  (>60)  mL/min


 


BUN/Creatinine Ratio   16.3  (14-18)  


 


Glucose   118 H  ()  mg/dL


 


Calcium   8.0 L  (8.5-10.1)  mg/dL


 


Total Bilirubin   0.9  (0.2-1.0)  mg/dL


 


AST   21  (15-37)  U/L


 


ALT   24  (14-59)  U/L


 


Alkaline Phosphatase   53  ()  U/L


 


Total Protein   5.9 L  (6.4-8.2)  g/dl


 


Albumin   2.8 L  (3.4-5.0)  g/dl


 


Globulin   3.1  gm/dL


 


Albumin/Globulin Ratio   0.9 L  (1-2)  











Med Orders - Current: 


 Current Medications





Aspirin (Ecotrin)  325 mg PO BID [facility]


   Last Admin: 09/12/17 08:22 Dose:  325 mg


Bisacodyl (Dulcolax)  5 mg PO DAILY PRN


   PRN Reason: Constipation


Calcium Carbonate/Glycine (Calcium Carbonate)  1,200 mg PO DAILY [facility]


   Last Admin: 09/12/17 08:22 Dose:  600 mg


Cholecalciferol (Vitamin D3)  5,000 units PO DAILY [facility]


   Last Admin: 09/12/17 08:21 Dose:  5,000 units


Cyclobenzaprine HCl (Flexeril)  10 mg PO TID PRN


   PRN Reason: Spasms


Docusate Sodium (Colace)  100 mg PO BID [facility]


   Last Admin: 09/12/17 08:23 Dose:  100 mg


Hydrochlorothiazide (Hydrochlorothiazide)  12.5 mg PO DAILY [facility]


   Last Admin: 09/12/17 08:22 Dose:  12.5 mg


Cefazolin Sodium/Dextrose 2 gm (/ Premix)  50 mls @ 100 mls/hr IV Q8H [facility]


   Stop: 09/12/17 13:29


   Last Admin: 09/12/17 04:00 Dose:  100 mls/hr


Ketorolac Tromethamine (Toradol)  15 mg IVPUSH Q8H PRN


   PRN Reason: Pain


   Last Admin: 09/12/17 08:24 Dose:  15 mg


Magnesium Hydroxide (Milk Of Magnesia)  30 ml PO BID PRN


   PRN Reason: Constipation


Miscellaneous Information (Remove Patch)  0 ea TRDERM ONETIME ONE


   Stop: 09/14/17 12:31


Morphine Sulfate (Morphine)  2 mg IVPUSH Q2H PRN


   PRN Reason: Breakthrough Pain


Naloxone HCl (Narcan)  0.1 mg IVPUSH Q5M PRN


   PRN Reason: Oversedation


Ondansetron HCl (Zofran)  4 mg IVPUSH Q6H PRN


   PRN Reason: Nausea/Vomiting


Oxycodone/Acetaminophen (Percocet 325-5 Mg)  1 - 2 tab PO Q4H PRN


   PRN Reason: Pain


   Last Admin: 09/12/17 08:23 Dose:  2 tab


Pantoprazole Sodium (Protonix***)  40 mg PO DAILY [facility]


   Last Admin: 09/12/17 08:22 Dose:  40 mg


Senna (Senna)  8.6 mg PO BID PRN


   PRN Reason: Constipation


Simvastatin (Zocor)  20 mg PO BEDTIME [facility]


   Last Admin: 09/11/17 20:05 Dose:  20 mg





Discontinued Medications





Bupivacaine HCl (Marcaine 0.25%) Confirm Administered Dose 30 ml .ROUTE .STK-

MED ONE


   Stop: 09/11/17 12:20


   Last Admin: 09/11/17 14:32 Dose:  30 ml


Cefazolin Sodium (Ancef) Confirm Administered Dose 2 gm .ROUTE .STK-MED ONE


   Stop: 09/11/17 11:23


   Last Admin: 09/11/17 14:17 Dose:  2 gm


Cefazolin Sodium (Ancef) Confirm Administered Dose 2 gm .ROUTE .STK-MED ONE


   Stop: 09/11/17 12:20


Morphine Sulfate 8 mg/Epinephrine HCl 0.3 mg/Cefuroxime Sodium 750 mg/Ketorolac 

Tromethamine 30 mg/Sodium Chloride 27.9 ml  0 mg .XX ONETIME ONE


   Stop: 09/11/17 12:01


   Last Admin: 09/11/17 18:21 Dose:  Not Given


Diphenhydramine HCl (Benadryl)  25 mg IVPUSH Q4H PRN


   PRN Reason: Nausea


Diphenhydramine HCl (Benadryl)  25 mg IVPUSH Q6H PRN


   PRN Reason: Pruritis


   Stop: 09/11/17 18:00


Diphenhydramine HCl (Benadryl)  25 mg PO ONETIME ONE


   Stop: 09/11/17 22:31


   Last Admin: 09/11/17 22:39 Dose:  25 mg


Ephedrine Sulfate (Ephedrine Sulfate) Confirm Administered Dose 50 mg .ROUTE 

.STK-MED ONE


   Stop: 09/11/17 11:23


Famotidine (Pepcid)  20 mg PO Q12H [facility]


Famotidine (Pepcid) Confirm Administered Dose 20 mg .ROUTE .STK-MED ONE


   Stop: 09/11/17 12:15


Fentanyl (Sublimaze) Confirm Administered Dose 100 mcg .ROUTE .STK-MED ONE


   Stop: 09/11/17 11:24


Haloperidol Lactate (Haldol)  1 mg IVPUSH ONETIME ONE


   Stop: 09/11/17 14:16


   Last Admin: 09/11/17 18:56 Dose:  Not Given


Lactated Ringer's (Ringers, Lactated)  1,000 mls @ 125 mls/hr IV ASDIRECTED [facility]


   Last Admin: 09/11/17 11:10 Dose:  125 mls/hr


Lidocaine HCl (Xylocaine-Mpf 1%) Confirm Administered Dose 4 mls @ as directed 

.ROUTE .STK-MED ONE


   Stop: 09/11/17 11:23


Iodine (Iodine 2% Mild Tincture) Confirm Administered Dose 30 ml .ROUTE .STK-

MED ONE


   Stop: 09/11/17 12:20


   Last Admin: 09/11/17 14:13 Dose:  18 ml


Lidocaine/Sodium Bicarbonate (Buffered Lidocaine 1% In Ns 8.4%)  0.25 ml IV 

ONETIME PRN


   PRN Reason: Prior to IV Start


   Stop: 09/11/17 23:00


   Last Admin: 09/11/17 11:09 Dose:  0.25 ml


Midazolam HCl (Versed 1 Mg/Ml) Confirm Administered Dose 2 mg .ROUTE .STK-MED 

ONE


   Stop: 09/11/17 11:24


Midazolam HCl (Versed 1 Mg/Ml) Confirm Administered Dose 2 mg .ROUTE .STK-MED 

ONE


   Stop: 09/11/17 13:01


Morphine Sulfate (Duramorph Pf) Confirm Administered Dose 10 mg .ROUTE .STK-MED 

ONE


   Stop: 09/11/17 11:24


Ondansetron HCl (Zofran)  4 mg IVPUSH ONETIME PRN


   PRN Reason: Nausea/Vomiting


   Stop: 09/11/17 18:00


Propofol (Diprivan  20 Ml) Confirm Administered Dose 400 mg .ROUTE .STK-MED ONE


   Stop: 09/11/17 11:24


Propofol (Diprivan  20 Ml) Confirm Administered Dose 400 mg .ROUTE .STK-MED ONE


   Stop: 09/11/17 14:10


Scopolamine (Transderm-Scop)  1.5 mg TRDERM ONETIME ONE


   Stop: 09/11/17 12:31


   Last Admin: 09/11/17 12:30 Dose:  1.5 mg


Sodium Chloride (Saline Flush)  10 ml FLUSH ASDIRECTED PRN


   PRN Reason: Keep Vein Open


   Stop: 09/11/17 23:00


Tranexamic Acid (Cyklokapron) Confirm Administered Dose 1,000 mg .ROUTE .STK-

MED ONE


   Stop: 09/11/17 12:20


   Last Admin: 09/11/17 14:31 Dose:  1,000 mg


Vancomycin HCl (Vancomycin) Confirm Administered Dose 1 gm .ROUTE .STK-MED ONE


   Stop: 09/11/17 12:20


   Last Admin: 09/11/17 14:27 Dose:  1 gm











- Exam


Quality Assessment: Supplemental Oxygen, DVT Prophylaxis


General: Alert, Oriented, Cooperative


HEENT: Pupils Equal, Pupils Reactive, Mucous Membr. Moist/Pink


Neck: Supple, Trachea Midline, No JVD


Lungs: Clear to Auscultation, Normal Respiratory Effort, Other


Cardiovascular: Regular Rate, Regular Rhythm, Murmurs (systolic at apex )


GI/Abdominal Exam: Normal Bowel Sounds, Soft, Non-Tender, No Distention, No Mass


 (Female) Exam: Deferred


Back Exam: Normal Inspection


Extremities: Other (left leg is wrapped from surgery. Right leg is unremarkable 

)


Peripheral Pulses: 2+: Radial (L), Radial (R), Posterior Tibial (R), Dorsalis 

Pedis (L), Dorsalis Pedis (R)


Skin: Warm, Dry, Intact


Wound/Incisions: Dressing Dry and Intact, No Drainage


Neurological: No New Focal Deficit


Psy/Mental Status: Alert, Normal Affect, Normal Mood





Consult PN Assessment/Plan


POD#: 1


Procedures: 


Procedures





BONE IMAGING 3 PHASE (07/13/17)


MICROBE SUSCEPTIBLE SUAD (06/03/17)


URINE BACTERIA CULTURE (06/03/17)


URINE CULTURE/COLONY COUNT (06/03/17)


X-RAY EXAM OF WRIST (06/04/14)








(1) Status post left knee replacement


SNOMED Code(s): 4329245341780, 6550724259697


   Code(s): Z96.652 - PRESENCE OF LEFT ARTIFICIAL KNEE JOINT   Priority: High   

Current Visit: Yes   





(2) HLD (hyperlipidemia)


SNOMED Code(s): 68597105


   Code(s): E78.5 - HYPERLIPIDEMIA, UNSPECIFIED   Priority: Low   Current Visit

: No   


Qualifiers: 


   Hyperlipidemia type: unspecified   Qualified Code(s): E78.5 - Hyperlipidemia

, unspecified   





(3) Mitral regurgitation


SNOMED Code(s): 10583684


   Code(s): I34.0 - NONRHEUMATIC MITRAL (VALVE) INSUFFICIENCY   Priority: 

Medium   Current Visit: Yes   


Qualifiers: 


   Cardiac valve disease etiology: etiology unspecified   Qualified Code(s): 

I34.0 - Nonrheumatic mitral (valve) insufficiency   





(4) MILI (obstructive sleep apnea)


SNOMED Code(s): 43487460


   Code(s): G47.33 - OBSTRUCTIVE SLEEP APNEA (ADULT) (PEDIATRIC)   Priority: 

Low   Current Visit: Yes   





(5) Osteoarthritis


SNOMED Code(s): 281324184


   Code(s): M19.90 - UNSPECIFIED OSTEOARTHRITIS, UNSPECIFIED SITE   Priority: 

Low   Current Visit: Yes   


Qualifiers: 


   Osteoarthritis location: unspecified site   Osteoarthritis type: unspecified

   Qualified Code(s): M19.90 - Unspecified osteoarthritis, unspecified site   





(6) HTN (hypertension)


SNOMED Code(s): 36436316


   Code(s): I10 - ESSENTIAL (PRIMARY) HYPERTENSION   Priority: Medium   Current 

Visit: Yes   


Qualifiers: 


   Hypertension type: unspecified   Qualified Code(s): I10 - Essential (primary

) hypertension   





(7) GERD (gastroesophageal reflux disease)


SNOMED Code(s): 505569620


   Code(s): K21.9 - GASTRO-ESOPHAGEAL REFLUX DISEASE WITHOUT ESOPHAGITIS   

Priority: Low   Current Visit: No   


Qualifiers: 


   Esophagitis presence: esophagitis presence not specified   Qualified Code(s)

: K21.9 - Gastro-esophageal reflux disease without esophagitis   





(8) Depression


SNOMED Code(s): 09170218


   Code(s): F32.9 - MAJOR DEPRESSIVE DISORDER, SINGLE EPISODE, UNSPECIFIED   

Priority: Low   Current Visit: Yes   


Qualifiers: 


   Depression Type: unspecified   Qualified Code(s): F32.9 - Major depressive 

disorder, single episode, unspecified   


Problem List Initiated/Reviewed/Updated: Yes


My Orders Last 24 Hours: 


My Active Orders





09/12/17 09:09


Acapella [RT Chest Physiotherapy] [RC] ASDIRECTED 











Plan: 


Impression:





Left knee arthroplasty, POD 1





Chronic:


Depression


OA


HLD


MR


Sleep apnea








Plan:





Home meds


Daily labs


Pain/DVT medical mgt per primary service


CM/PT/OT

## 2017-09-13 VITALS — DIASTOLIC BLOOD PRESSURE: 70 MMHG | SYSTOLIC BLOOD PRESSURE: 134 MMHG

## 2017-09-13 RX ADMIN — ASPIRIN SCH MG: 325 TABLET, DELAYED RELEASE ORAL at 08:51

## 2017-09-13 RX ADMIN — SODIUM CHLORIDE PRN MG: 9 INJECTION, SOLUTION INTRAVENOUS at 09:38

## 2017-09-13 RX ADMIN — VITAMIN D, TAB 1000IU (100/BT) SCH UNITS: 25 TAB at 08:52

## 2017-09-13 RX ADMIN — OXYCODONE HYDROCHLORIDE AND ACETAMINOPHEN PRN TAB: 5; 325 TABLET ORAL at 06:03

## 2017-09-13 NOTE — PCM.SURGPN
- General Info


Date of Service: 09/13/17


POD#: 2


Functional Status: Reports: Pain Controlled, Tolerating Diet, Ambulating, 

Urinating, Other (The pt was able to wean from O2 use.  She feels "much better" 

today and is prepared for discharge to home.)





- Patient Data


Vitals - Most Recent: 


 Last Vital Signs











Temp  97.7 F   09/13/17 08:29


 


Pulse  82   09/13/17 08:29


 


Resp  24 H  09/13/17 08:29


 


BP  150/52 H  09/13/17 08:29


 


Pulse Ox  93 L  09/13/17 08:32











Weight - Most Recent: 237 lb 1 oz


I&O - Last 24 Hours: 


 Intake & Output











 09/12/17 09/13/17 09/13/17





 22:59 06:59 14:59


 


Intake Total 1430 600 120


 


Output Total 250 700 


 


Balance 1180 -100 120











Lab Results Last 24 Hrs: 


 Laboratory Results - last 24 hr











  09/13/17 09/13/17 Range/Units





  07:10 07:10 


 


WBC  9.47   (3.98-10.04)  K/mm3


 


RBC  3.12 L   (3.98-5.22)  M/mm3


 


Hgb  9.6 L   (11.2-15.7)  gm/L


 


Hct  28.6 L   (34.1-44.9)  %


 


MCV  91.7   (79.4-94.8)  fl


 


MCH  30.8   (25.6-32.2)  pg


 


MCHC  33.6   (32.2-35.5)  g/dl


 


RDW Std Deviation  43.7   (36.4-46.3)  fL


 


Plt Count  155 L   (182-369)  K/mm3


 


MPV  11.7   (9.4-12.3)  fl


 


Neut % (Auto)  72.6 H   (34.0-71.1)  %


 


Lymph % (Auto)  13.6 L   (19.3-51.7)  %


 


Mono % (Auto)  11.2   (4.7-12.5)  %


 


Eos % (Auto)  2.3   (0.7-5.8)  


 


Baso % (Auto)  0.1   (0.1-1.2)  %


 


Neut # (Auto)  6.87 H   (1.56-6.13)  K/mm3


 


Lymph # (Auto)  1.29   (1.18-3.74)  K/mm3


 


Mono # (Auto)  1.06 H   (0.24-0.36)  K/mm3


 


Eos # (Auto)  0.22   (0.04-0.36)  K/mm3


 


Baso # (Auto)  0.01   (0.01-0.08)  K/mm3


 


Sodium   131 L  (136-145)  mEq/L


 


Potassium   3.4 L  (3.5-5.1)  mEq/L


 


Chloride   97 L  ()  mEq/L


 


Carbon Dioxide   31  (21-32)  mEq/L


 


Anion Gap   6.4  (5-15)  


 


BUN   11  (7-18)  mg/dL


 


Creatinine   0.7  (0.55-1.02)  mg/dL


 


Est Cr Clr Drug Dosing   58.04  mL/min


 


Estimated GFR (MDRD)   > 60  (>60)  mL/min


 


BUN/Creatinine Ratio   15.7  (14-18)  


 


Glucose   106  ()  mg/dL


 


Calcium   8.0 L  (8.5-10.1)  mg/dL











Med Orders - Current: 


 Current Medications





Aspirin (Ecotrin)  325 mg PO BID [facility]


   Last Admin: 09/13/17 08:51 Dose:  325 mg


Bisacodyl (Dulcolax)  5 mg PO DAILY PRN


   PRN Reason: Constipation


   Last Admin: 09/13/17 10:51 Dose:  5 mg


Calcium Carbonate/Glycine (Calcium Carbonate)  1,200 mg PO DAILY [facility]


   Last Admin: 09/13/17 08:51 Dose:  1,200 mg


Cholecalciferol (Vitamin D3)  5,000 units PO DAILY [facility]


   Last Admin: 09/13/17 08:52 Dose:  5,000 units


Cyclobenzaprine HCl (Flexeril)  10 mg PO TID PRN


   PRN Reason: Spasms


Docusate Sodium (Colace)  100 mg PO BID [facility]


   Last Admin: 09/13/17 08:51 Dose:  100 mg


Hydrochlorothiazide (Hydrochlorothiazide)  12.5 mg PO DAILY [facility]


   Last Admin: 09/13/17 08:51 Dose:  12.5 mg


Magnesium Hydroxide (Milk Of Magnesia)  30 ml PO BID PRN


   PRN Reason: Constipation


Miscellaneous Information (Remove Patch)  0 ea TRDERM ONETIME ONE


   Stop: 09/14/17 12:31


Morphine Sulfate (Morphine)  2 mg IVPUSH Q2H PRN


   PRN Reason: Breakthrough Pain


   Last Admin: 09/12/17 16:16 Dose:  2 mg


Naloxone HCl (Narcan)  0.1 mg IVPUSH Q5M PRN


   PRN Reason: Oversedation


Ondansetron HCl (Zofran)  4 mg IVPUSH Q6H PRN


   PRN Reason: Nausea/Vomiting


   Last Admin: 09/13/17 09:38 Dose:  4 mg


Oxycodone/Acetaminophen (Percocet 325-5 Mg)  1 - 2 tab PO Q4H PRN


   PRN Reason: Pain


   Last Admin: 09/13/17 06:03 Dose:  2 tab


Pantoprazole Sodium (Protonix***)  40 mg PO DAILY [facility]


   Last Admin: 09/13/17 08:52 Dose:  Not Given


Senna (Senna)  8.6 mg PO BID PRN


   PRN Reason: Constipation


   Last Admin: 09/13/17 10:51 Dose:  8.6 mg


Simvastatin (Zocor)  20 mg PO BEDTIME [facility]


   Last Admin: 09/12/17 21:58 Dose:  20 mg





Discontinued Medications





Bupivacaine HCl (Marcaine 0.25%) Confirm Administered Dose 30 ml .ROUTE .STK-

MED ONE


   Stop: 09/11/17 12:20


   Last Admin: 09/11/17 14:32 Dose:  30 ml


Cefazolin Sodium (Ancef) Confirm Administered Dose 2 gm .ROUTE .STK-MED ONE


   Stop: 09/11/17 11:23


   Last Admin: 09/11/17 14:17 Dose:  2 gm


Cefazolin Sodium (Ancef) Confirm Administered Dose 2 gm .ROUTE .STK-MED ONE


   Stop: 09/11/17 12:20


Morphine Sulfate 8 mg/Epinephrine HCl 0.3 mg/Cefuroxime Sodium 750 mg/Ketorolac 

Tromethamine 30 mg/Sodium Chloride 27.9 ml  0 mg .XX ONETIME ONE


   Stop: 09/11/17 12:01


   Last Admin: 09/11/17 18:21 Dose:  Not Given


Diphenhydramine HCl (Benadryl)  25 mg IVPUSH Q4H PRN


   PRN Reason: Nausea


Diphenhydramine HCl (Benadryl)  25 mg IVPUSH Q6H PRN


   PRN Reason: Pruritis


   Stop: 09/11/17 18:00


Diphenhydramine HCl (Benadryl)  25 mg PO ONETIME ONE


   Stop: 09/11/17 22:31


   Last Admin: 09/11/17 22:39 Dose:  25 mg


Ephedrine Sulfate (Ephedrine Sulfate) Confirm Administered Dose 50 mg .ROUTE 

.STK-MED ONE


   Stop: 09/11/17 11:23


Famotidine (Pepcid)  20 mg PO Q12H [facility]


Famotidine (Pepcid) Confirm Administered Dose 20 mg .ROUTE .STK-MED ONE


   Stop: 09/11/17 12:15


Fentanyl (Sublimaze) Confirm Administered Dose 100 mcg .ROUTE .STK-MED ONE


   Stop: 09/11/17 11:24


Haloperidol Lactate (Haldol)  1 mg IVPUSH ONETIME ONE


   Stop: 09/11/17 14:16


   Last Admin: 09/11/17 18:56 Dose:  Not Given


Lactated Ringer's (Ringers, Lactated)  1,000 mls @ 125 mls/hr IV ASDIRECTED [facility]


   Last Admin: 09/11/17 11:10 Dose:  125 mls/hr


Cefazolin Sodium/Dextrose 2 gm (/ Premix)  50 mls @ 100 mls/hr IV Q8H [facility]


   Stop: 09/12/17 13:29


   Last Admin: 09/12/17 12:08 Dose:  100 mls/hr


Lidocaine HCl (Xylocaine-Mpf 1%) Confirm Administered Dose 4 mls @ as directed 

.ROUTE .STK-MED ONE


   Stop: 09/11/17 11:23


Iodine (Iodine 2% Mild Tincture) Confirm Administered Dose 30 ml .ROUTE .STK-

MED ONE


   Stop: 09/11/17 12:20


   Last Admin: 09/11/17 14:13 Dose:  18 ml


Ketorolac Tromethamine (Toradol)  15 mg IVPUSH Q8H PRN


   PRN Reason: Pain


   Last Admin: 09/12/17 21:57 Dose:  15 mg


Lidocaine/Sodium Bicarbonate (Buffered Lidocaine 1% In Ns 8.4%)  0.25 ml IV 

ONETIME PRN


   PRN Reason: Prior to IV Start


   Stop: 09/11/17 23:00


   Last Admin: 09/11/17 11:09 Dose:  0.25 ml


Midazolam HCl (Versed 1 Mg/Ml) Confirm Administered Dose 2 mg .ROUTE .STK-MED 

ONE


   Stop: 09/11/17 11:24


Midazolam HCl (Versed 1 Mg/Ml) Confirm Administered Dose 2 mg .ROUTE .STK-MED 

ONE


   Stop: 09/11/17 13:01


Morphine Sulfate (Duramorph Pf) Confirm Administered Dose 10 mg .ROUTE .STK-MED 

ONE


   Stop: 09/11/17 11:24


Ondansetron HCl (Zofran)  4 mg IVPUSH ONETIME PRN


   PRN Reason: Nausea/Vomiting


   Stop: 09/11/17 18:00


Propofol (Diprivan  20 Ml) Confirm Administered Dose 400 mg .ROUTE .STK-MED ONE


   Stop: 09/11/17 11:24


Propofol (Diprivan  20 Ml) Confirm Administered Dose 400 mg .ROUTE .STK-MED ONE


   Stop: 09/11/17 14:10


Scopolamine (Transderm-Scop)  1.5 mg TRDERM ONETIME ONE


   Stop: 09/11/17 12:31


   Last Admin: 09/11/17 12:30 Dose:  1.5 mg


Sodium Chloride (Saline Flush)  10 ml FLUSH ASDIRECTED PRN


   PRN Reason: Keep Vein Open


   Stop: 09/11/17 23:00


Tranexamic Acid (Cyklokapron) Confirm Administered Dose 1,000 mg .ROUTE .STK-

MED ONE


   Stop: 09/11/17 12:20


   Last Admin: 09/11/17 14:31 Dose:  1,000 mg


Vancomycin HCl (Vancomycin) Confirm Administered Dose 1 gm .ROUTE .STK-MED ONE


   Stop: 09/11/17 12:20


   Last Admin: 09/11/17 14:27 Dose:  1 gm











- Exam


Wound/Incisions: Dressing Dry and Intact


General: Alert, Cooperative, No Acute Distress


Lungs: Normal Respiratory Effort


Extremities: Other (NVS intact for LLE.  Andrea's negative.  )





- Problem List Review


Problem List Initiated/Reviewed/Updated: Yes





- My Orders


Last 24 Hours: 


 Active Orders 24 hr











 Category Date Time Status


 


 Evaluate for Home Oxygen [RT Evaluate for Home Oxygen] Care  09/12/17 11:00 

Active





 [RC] Click to Edit   


 


 Ready for Discharge [RC] PER UNIT ROUTINE Care  09/13/17 10:48 Active


 


 Remove Patch Med  09/14/17 12:30 Once





 0 ea TRDERM ONETIME ONE   








 Medication Orders





Aspirin (Ecotrin)  325 mg PO BID [facility]


   Last Admin: 09/13/17 08:51  Dose: 325 mg


   Admin: 09/12/17 21:58  Dose: 325 mg


   Admin: 09/12/17 08:22  Dose: 325 mg


Bisacodyl (Dulcolax)  5 mg PO DAILY PRN


   PRN Reason: Constipation


   Last Admin: 09/13/17 10:51  Dose: 5 mg


Calcium Carbonate/Glycine (Calcium Carbonate)  1,200 mg PO DAILY [facility]


   Last Admin: 09/13/17 08:51  Dose: 1,200 mg


   Admin: 09/12/17 08:22  Dose: 600 mg


Cholecalciferol (Vitamin D3)  5,000 units PO DAILY [facility]


   Last Admin: 09/13/17 08:52  Dose: 5,000 units


   Admin: 09/12/17 08:21  Dose: 5,000 units


Cyclobenzaprine HCl (Flexeril)  10 mg PO TID PRN


   PRN Reason: Spasms


Docusate Sodium (Colace)  100 mg PO BID [facility]


   Last Admin: 09/13/17 08:51  Dose: 100 mg


   Admin: 09/12/17 21:58  Dose: 100 mg


   Admin: 09/12/17 08:23  Dose: 100 mg


   Admin: 09/11/17 20:05  Dose: 100 mg


Hydrochlorothiazide (Hydrochlorothiazide)  12.5 mg PO DAILY [facility]


   Last Admin: 09/13/17 08:51  Dose: 12.5 mg


   Admin: 09/12/17 08:22  Dose: 12.5 mg


Magnesium Hydroxide (Milk Of Magnesia)  30 ml PO BID PRN


   PRN Reason: Constipation


Miscellaneous Information (Remove Patch)  0 ea TRDERM ONETIME ONE


   Stop: 09/14/17 12:31


Morphine Sulfate (Morphine)  2 mg IVPUSH Q2H PRN


   PRN Reason: Breakthrough Pain


   Last Admin: 09/12/17 16:16  Dose: 2 mg


Naloxone HCl (Narcan)  0.1 mg IVPUSH Q5M PRN


   PRN Reason: Oversedation


Ondansetron HCl (Zofran)  4 mg IVPUSH Q6H PRN


   PRN Reason: Nausea/Vomiting


   Last Admin: 09/13/17 09:38  Dose: 4 mg


   Admin: 09/12/17 12:08  Dose: 4 mg


Oxycodone/Acetaminophen (Percocet 325-5 Mg)  1 - 2 tab PO Q4H PRN


   PRN Reason: Pain


   Last Admin: 09/13/17 06:03  Dose: 2 tab


   Admin: 09/12/17 22:32  Dose: 2 tab


   Admin: 09/12/17 18:48  Dose: 2 tab


   Admin: 09/12/17 12:57  Dose: 2 tab


   Admin: 09/12/17 08:23  Dose: 2 tab


   Admin: 09/12/17 03:59  Dose: 2 tab


   Admin: 09/11/17 21:38  Dose: 2 tab


Pantoprazole Sodium (Protonix***)  40 mg PO DAILY [facility]


   Last Admin: 09/13/17 08:52  Dose:  


   Admin: 09/13/17 07:44  Dose: 40 mg


   Admin: 09/12/17 08:22  Dose: 40 mg


Senna (Senna)  8.6 mg PO BID PRN


   PRN Reason: Constipation


   Last Admin: 09/13/17 10:51  Dose: 8.6 mg


Simvastatin (Zocor)  20 mg PO BEDTIME [facility]


   Last Admin: 09/12/17 21:58  Dose: 20 mg


   Admin: 09/11/17 20:05  Dose: 20 mg











- Assessment


Assessment           (Free Text/Narrative):: 





POD#2 - left TKA





- Plan


Plan                        (Free Text/Narrative):: 





1.  Discharge to home today.


2.  325mg ASA BID.  Frequent mobility, TEDs.


3.  Further orders per Hospitalist service.





The pt's case was discussed with Dr. Perales.

## 2017-09-13 NOTE — PCM.CONSN
- General Info


Date of Service: 09/13/17


Admission Dx/Problem (Free Text): 


S/P left knee arthroplasty 





Subjective Update: 


67 year old female with left knee OA presents post op.  She is day 1 S/P left 

total knee arthroplasty.  The hospitalist service has been consulted to assist 

with medical management.


Functional Status: Reports: Pain Controlled, Tolerating Diet, Ambulating, 

Urinating, Incentive Spirometry.  Denies: New Symptoms





- Review of Systems


General: Reports: No Symptoms


HEENT: Reports: No Symptoms


Pulmonary: Reports: No Symptoms


Cardiovascular: Reports: No Symptoms


Gastrointestinal: Reports: No Symptoms


Genitourinary: Reports: No Symptoms


Musculoskeletal: Reports: Leg Pain


Skin: Reports: No Symptoms


Neurological: Reports: No Symptoms


Psychiatric: Reports: No Symptoms





- Patient Data


Vitals - Most Recent: 


 Last Vital Signs











Temp  97.7 F   09/13/17 08:29


 


Pulse  82   09/13/17 08:29


 


Resp  24 H  09/13/17 08:29


 


BP  150/52 H  09/13/17 08:29


 


Pulse Ox  93 L  09/13/17 08:32











Weight - Most Recent: 237 lb 1 oz


I&O - Last 24 Hours: 


 Intake & Output











 09/12/17 09/13/17 09/13/17





 22:59 06:59 14:59


 


Intake Total 1430 600 120


 


Output Total 250 700 


 


Balance 1180 -100 120











Lab Results Last 24 Hours: 


 Laboratory Results - last 24 hr











  09/13/17 09/13/17 Range/Units





  07:10 07:10 


 


WBC  9.47   (3.98-10.04)  K/mm3


 


RBC  3.12 L   (3.98-5.22)  M/mm3


 


Hgb  9.6 L   (11.2-15.7)  gm/L


 


Hct  28.6 L   (34.1-44.9)  %


 


MCV  91.7   (79.4-94.8)  fl


 


MCH  30.8   (25.6-32.2)  pg


 


MCHC  33.6   (32.2-35.5)  g/dl


 


RDW Std Deviation  43.7   (36.4-46.3)  fL


 


Plt Count  155 L   (182-369)  K/mm3


 


MPV  11.7   (9.4-12.3)  fl


 


Neut % (Auto)  72.6 H   (34.0-71.1)  %


 


Lymph % (Auto)  13.6 L   (19.3-51.7)  %


 


Mono % (Auto)  11.2   (4.7-12.5)  %


 


Eos % (Auto)  2.3   (0.7-5.8)  


 


Baso % (Auto)  0.1   (0.1-1.2)  %


 


Neut # (Auto)  6.87 H   (1.56-6.13)  K/mm3


 


Lymph # (Auto)  1.29   (1.18-3.74)  K/mm3


 


Mono # (Auto)  1.06 H   (0.24-0.36)  K/mm3


 


Eos # (Auto)  0.22   (0.04-0.36)  K/mm3


 


Baso # (Auto)  0.01   (0.01-0.08)  K/mm3


 


Sodium   131 L  (136-145)  mEq/L


 


Potassium   3.4 L  (3.5-5.1)  mEq/L


 


Chloride   97 L  ()  mEq/L


 


Carbon Dioxide   31  (21-32)  mEq/L


 


Anion Gap   6.4  (5-15)  


 


BUN   11  (7-18)  mg/dL


 


Creatinine   0.7  (0.55-1.02)  mg/dL


 


Est Cr Clr Drug Dosing   58.04  mL/min


 


Estimated GFR (MDRD)   > 60  (>60)  mL/min


 


BUN/Creatinine Ratio   15.7  (14-18)  


 


Glucose   106  ()  mg/dL


 


Calcium   8.0 L  (8.5-10.1)  mg/dL











Med Orders - Current: 


 Current Medications





Aspirin (Ecotrin)  325 mg PO BID [facility]


   Last Admin: 09/13/17 08:51 Dose:  325 mg


Bisacodyl (Dulcolax)  5 mg PO DAILY PRN


   PRN Reason: Constipation


   Last Admin: 09/13/17 10:51 Dose:  5 mg


Calcium Carbonate/Glycine (Calcium Carbonate)  1,200 mg PO DAILY [facility]


   Last Admin: 09/13/17 08:51 Dose:  1,200 mg


Cholecalciferol (Vitamin D3)  5,000 units PO DAILY [facility]


   Last Admin: 09/13/17 08:52 Dose:  5,000 units


Cyclobenzaprine HCl (Flexeril)  10 mg PO TID PRN


   PRN Reason: Spasms


Docusate Sodium (Colace)  100 mg PO BID [facility]


   Last Admin: 09/13/17 08:51 Dose:  100 mg


Hydrochlorothiazide (Hydrochlorothiazide)  12.5 mg PO DAILY [facility]


   Last Admin: 09/13/17 08:51 Dose:  12.5 mg


Magnesium Hydroxide (Milk Of Magnesia)  30 ml PO BID PRN


   PRN Reason: Constipation


Miscellaneous Information (Remove Patch)  0 ea TRDERM ONETIME ONE


   Stop: 09/14/17 12:31


Morphine Sulfate (Morphine)  2 mg IVPUSH Q2H PRN


   PRN Reason: Breakthrough Pain


   Last Admin: 09/12/17 16:16 Dose:  2 mg


Naloxone HCl (Narcan)  0.1 mg IVPUSH Q5M PRN


   PRN Reason: Oversedation


Ondansetron HCl (Zofran)  4 mg IVPUSH Q6H PRN


   PRN Reason: Nausea/Vomiting


   Last Admin: 09/13/17 09:38 Dose:  4 mg


Oxycodone/Acetaminophen (Percocet 325-5 Mg)  1 - 2 tab PO Q4H PRN


   PRN Reason: Pain


   Last Admin: 09/13/17 06:03 Dose:  2 tab


Pantoprazole Sodium (Protonix***)  40 mg PO DAILY [facility]


   Last Admin: 09/13/17 08:52 Dose:  Not Given


Senna (Senna)  8.6 mg PO BID PRN


   PRN Reason: Constipation


   Last Admin: 09/13/17 10:51 Dose:  8.6 mg


Simvastatin (Zocor)  20 mg PO BEDTIME [facility]


   Last Admin: 09/12/17 21:58 Dose:  20 mg





Discontinued Medications





Bupivacaine HCl (Marcaine 0.25%) Confirm Administered Dose 30 ml .ROUTE .STK-

MED ONE


   Stop: 09/11/17 12:20


   Last Admin: 09/11/17 14:32 Dose:  30 ml


Cefazolin Sodium (Ancef) Confirm Administered Dose 2 gm .ROUTE .STK-MED ONE


   Stop: 09/11/17 11:23


   Last Admin: 09/11/17 14:17 Dose:  2 gm


Cefazolin Sodium (Ancef) Confirm Administered Dose 2 gm .ROUTE .STK-MED ONE


   Stop: 09/11/17 12:20


Morphine Sulfate 8 mg/Epinephrine HCl 0.3 mg/Cefuroxime Sodium 750 mg/Ketorolac 

Tromethamine 30 mg/Sodium Chloride 27.9 ml  0 mg .XX ONETIME ONE


   Stop: 09/11/17 12:01


   Last Admin: 09/11/17 18:21 Dose:  Not Given


Diphenhydramine HCl (Benadryl)  25 mg IVPUSH Q4H PRN


   PRN Reason: Nausea


Diphenhydramine HCl (Benadryl)  25 mg IVPUSH Q6H PRN


   PRN Reason: Pruritis


   Stop: 09/11/17 18:00


Diphenhydramine HCl (Benadryl)  25 mg PO ONETIME ONE


   Stop: 09/11/17 22:31


   Last Admin: 09/11/17 22:39 Dose:  25 mg


Diphtheria/Tetanus/Acell Pertussis (Adacel)  0.5 ml IM .ONCE ONE


   Stop: 09/13/17 11:24


   Last Admin: 09/13/17 11:52 Dose:  0.5 ml


Ephedrine Sulfate (Ephedrine Sulfate) Confirm Administered Dose 50 mg .ROUTE 

.STK-MED ONE


   Stop: 09/11/17 11:23


Famotidine (Pepcid)  20 mg PO Q12H [facility]


Famotidine (Pepcid) Confirm Administered Dose 20 mg .ROUTE .STK-MED ONE


   Stop: 09/11/17 12:15


Fentanyl (Sublimaze) Confirm Administered Dose 100 mcg .ROUTE .STK-MED ONE


   Stop: 09/11/17 11:24


Haloperidol Lactate (Haldol)  1 mg IVPUSH ONETIME ONE


   Stop: 09/11/17 14:16


   Last Admin: 09/11/17 18:56 Dose:  Not Given


Lactated Ringer's (Ringers, Lactated)  1,000 mls @ 125 mls/hr IV ASDIRECTED [facility]


   Last Admin: 09/11/17 11:10 Dose:  125 mls/hr


Cefazolin Sodium/Dextrose 2 gm (/ Premix)  50 mls @ 100 mls/hr IV Q8H [facility]


   Stop: 09/12/17 13:29


   Last Admin: 09/12/17 12:08 Dose:  100 mls/hr


Lidocaine HCl (Xylocaine-Mpf 1%) Confirm Administered Dose 4 mls @ as directed 

.ROUTE .STK-MED ONE


   Stop: 09/11/17 11:23


Iodine (Iodine 2% Mild Tincture) Confirm Administered Dose 30 ml .ROUTE .STK-

MED ONE


   Stop: 09/11/17 12:20


   Last Admin: 09/11/17 14:13 Dose:  18 ml


Ketorolac Tromethamine (Toradol)  15 mg IVPUSH Q8H PRN


   PRN Reason: Pain


   Last Admin: 09/12/17 21:57 Dose:  15 mg


Lidocaine/Sodium Bicarbonate (Buffered Lidocaine 1% In Ns 8.4%)  0.25 ml IV 

ONETIME PRN


   PRN Reason: Prior to IV Start


   Stop: 09/11/17 23:00


   Last Admin: 09/11/17 11:09 Dose:  0.25 ml


Midazolam HCl (Versed 1 Mg/Ml) Confirm Administered Dose 2 mg .ROUTE .STK-MED 

ONE


   Stop: 09/11/17 11:24


Midazolam HCl (Versed 1 Mg/Ml) Confirm Administered Dose 2 mg .ROUTE .STK-MED 

ONE


   Stop: 09/11/17 13:01


Morphine Sulfate (Duramorph Pf) Confirm Administered Dose 10 mg .ROUTE .STK-MED 

ONE


   Stop: 09/11/17 11:24


Ondansetron HCl (Zofran)  4 mg IVPUSH ONETIME PRN


   PRN Reason: Nausea/Vomiting


   Stop: 09/11/17 18:00


Pneumococcal 13-Valent Conj Vacc (Prevnar 13)  0.5 ml IM .ONCE ONE


   Stop: 09/13/17 11:24


   Last Admin: 09/13/17 11:38 Dose:  0.5 ml


Propofol (Diprivan  20 Ml) Confirm Administered Dose 400 mg .ROUTE .STK-MED ONE


   Stop: 09/11/17 11:24


Propofol (Diprivan  20 Ml) Confirm Administered Dose 400 mg .ROUTE .STK-MED ONE


   Stop: 09/11/17 14:10


Scopolamine (Transderm-Scop)  1.5 mg TRDERM ONETIME ONE


   Stop: 09/11/17 12:31


   Last Admin: 09/11/17 12:30 Dose:  1.5 mg


Sodium Chloride (Saline Flush)  10 ml FLUSH ASDIRECTED PRN


   PRN Reason: Keep Vein Open


   Stop: 09/11/17 23:00


Tranexamic Acid (Cyklokapron) Confirm Administered Dose 1,000 mg .ROUTE .STK-

MED ONE


   Stop: 09/11/17 12:20


   Last Admin: 09/11/17 14:31 Dose:  1,000 mg


Vancomycin HCl (Vancomycin) Confirm Administered Dose 1 gm .ROUTE .STK-MED ONE


   Stop: 09/11/17 12:20


   Last Admin: 09/11/17 14:27 Dose:  1 gm











- Exam


Quality Assessment: DVT Prophylaxis.  No: Supplemental Oxygen


General: Alert, Oriented, Cooperative


HEENT: Pupils Equal, Pupils Reactive, Mucous Membr. Moist/Pink


Neck: Supple, Trachea Midline, No JVD


Lungs: Clear to Auscultation, Normal Respiratory Effort, Decreased Breath Sounds


Cardiovascular: Regular Rate, Regular Rhythm


GI/Abdominal Exam: Normal Bowel Sounds, Soft, Non-Tender, No Organomegaly, No 

Distention, No Abnormal Bruit, No Mass, Pelvis Stable


 (Female) Exam: Deferred


Back Exam: Normal Inspection


Extremities: Normal Inspection, Normal Range of Motion, No Pedal Edema, Normal 

Capillary Refill


Peripheral Pulses: 2+: Radial (L), Radial (R), Posterior Tibial (R), Dorsalis 

Pedis (R)


Skin: Warm, Dry, Intact


Wound/Incisions: Dressing Dry and Intact, No Drainage


Neurological: No New Focal Deficit


Psy/Mental Status: Alert, Normal Affect, Normal Mood





Consult PN Assessment/Plan


POD#: 2


Procedures: 


Procedures





BONE IMAGING 3 PHASE (07/13/17)


MICROBE SUSCEPTIBLE SUAD (06/03/17)


URINE BACTERIA CULTURE (06/03/17)


URINE CULTURE/COLONY COUNT (06/03/17)


X-RAY EXAM OF WRIST (06/04/14)








(1) Status post left knee replacement


SNOMED Code(s): 3838323088455, 4589268795185


   Code(s): Z96.652 - PRESENCE OF LEFT ARTIFICIAL KNEE JOINT   Priority: High   

Current Visit: Yes   





(2) HLD (hyperlipidemia)


SNOMED Code(s): 11607507


   Code(s): E78.5 - HYPERLIPIDEMIA, UNSPECIFIED   Priority: Low   Current Visit

: No   


Qualifiers: 


   Hyperlipidemia type: unspecified   Qualified Code(s): E78.5 - Hyperlipidemia

, unspecified   





(3) Mitral regurgitation


SNOMED Code(s): 82503453


   Code(s): I34.0 - NONRHEUMATIC MITRAL (VALVE) INSUFFICIENCY   Priority: 

Medium   Current Visit: Yes   


Qualifiers: 


   Cardiac valve disease etiology: etiology unspecified   Qualified Code(s): 

I34.0 - Nonrheumatic mitral (valve) insufficiency   





(4) MILI (obstructive sleep apnea)


SNOMED Code(s): 37340954


   Code(s): G47.33 - OBSTRUCTIVE SLEEP APNEA (ADULT) (PEDIATRIC)   Priority: 

Low   Current Visit: Yes   





(5) Osteoarthritis


SNOMED Code(s): 439645998


   Code(s): M19.90 - UNSPECIFIED OSTEOARTHRITIS, UNSPECIFIED SITE   Priority: 

Low   Current Visit: Yes   


Qualifiers: 


   Osteoarthritis location: unspecified site   Osteoarthritis type: unspecified

   Qualified Code(s): M19.90 - Unspecified osteoarthritis, unspecified site   





(6) HTN (hypertension)


SNOMED Code(s): 23324298


   Code(s): I10 - ESSENTIAL (PRIMARY) HYPERTENSION   Priority: Medium   Current 

Visit: Yes   


Qualifiers: 


   Hypertension type: unspecified   Qualified Code(s): I10 - Essential (primary

) hypertension   





(7) GERD (gastroesophageal reflux disease)


SNOMED Code(s): 916722589


   Code(s): K21.9 - GASTRO-ESOPHAGEAL REFLUX DISEASE WITHOUT ESOPHAGITIS   

Priority: Low   Current Visit: No   


Qualifiers: 


   Esophagitis presence: esophagitis presence not specified   Qualified Code(s)

: K21.9 - Gastro-esophageal reflux disease without esophagitis   





(8) Depression


SNOMED Code(s): 85830591


   Code(s): F32.9 - MAJOR DEPRESSIVE DISORDER, SINGLE EPISODE, UNSPECIFIED   

Priority: Low   Current Visit: Yes   


Qualifiers: 


   Depression Type: unspecified   Qualified Code(s): F32.9 - Major depressive 

disorder, single episode, unspecified   





(9) Hypoxia


SNOMED Code(s): 545847468, 802843171


   Code(s): R09.02 - HYPOXEMIA   Priority: High   Current Visit: Yes   


Problem List Initiated/Reviewed/Updated: Yes


Plan: 


Impression:





Patient had low oxygen saturation yesterday on several occasions.  This was 

addressed in the prior note.  Today saturations have been good.  PT reports 

patient walked several feet and when returning back to bed saturations dropped 

but rebounded very quickly.  RTC in to see patient.  Reports she feels 

comfortable discharging patient without home oxygen.  Reports patient needs to 

monitor MILI and wear her CPAP.  Patient is moderately sleepy due to pain 

medications.  Advised patient to wear her CPAP when napping as well as when 

going to bed for the night.  We'll discuss the importance of this with family 

in addition to patient as her oxygen sats do drop when she is sleeping.  From a 

medical standpoint she is clear for discharge pending approval from orthopedic 

service.





Left knee arthroplasty, POD 2





Chronic:


Depression


OA


HLD


MR


Sleep apnea








Plan:





Home meds


Daily labs


Pain/DVT medical mgt per primary service


CM/PT/OT


Discharge today pending ortho approval.

## 2017-09-13 NOTE — PCM.OPNOTE
- General Post-Op/Procedure Note


Date of Surgery/Procedure: 09/11/17


Operative Procedure(s): left total knee arthroplasty


Pre Op Diagnosis: left knee osteoarthrosis


Post-Op Diagnosis: Same


Anesthesia Technique: Local, MAC, Spinal


Primary Surgeon: Dank Perales


Anesthesia Provider: Chrissy Hawkins


Assistant: Paty Mckinley


Assistant: Job Ramsay


EBL in mLs: 380


Complications: None


Condition: Good


Free Text/Narrative:: 


 Intake & Output











 09/13/17 09/13/17 09/14/17





 14:59 22:59 06:59


 


Intake Total 120 1400 


 


Output Total  1500 


 


Balance 120 -100

## 2017-09-13 NOTE — OR
DATE OF OPERATION:  09/11/2017

 

SURGEON:  Dank Perales MD

 

OPERATION PERFORMED:  Left total knee arthroplasty.

 

PREOPERATIVE DIAGNOSIS:

Left knee osteoarthrosis.

 

POSTOPERATIVE DIAGNOSIS:

Left knee osteoarthrosis.

 

ANESTHESIA:

Local MAC and spinal.

 

ANESTHESIA PROVIDER:

Jacinda Brumfield CRNA.

 

ASSISTANTS:

Paty Mckinley LPN, and Job Ramsay M.D.

 

ESTIMATED BLOOD LOSS:

380 mL.

 

COMPLICATIONS:

None.

 

CONDITION:

Stable.

 

IMPLANTS:

1. [place] size 5 PS press-fit femur.

2. [place] size 4 press-fit universal tibial baseplate.

3. Aaron size 4, 9 mm PS X3 polyethylene.

4. Aaron 29 x 9 mm press-fit asymmetric patella.

 

DESCRIPTION OF PROCEDURE:

The patient was identified in the preop holding area.  Proper site was marked

and identified by the surgeon.  The patient was taken back to the operating

theater, where after adequate anesthesia, the patient's left lower extremity had

a nonsterile tourniquet applied.  It was then sterilely prepped and draped in

the usual sterile fashion.  OR time-out was performed.  The patient received 2 g

IV Ancef.  At this time, left lower extremity was exsanguinated.  Tourniquet was

insufflated to 250 mmHg.  Standard anterior incision was made, and the medial

parapatellar arthrotomy was created.  Deep fibers of the MCL were raised, and

the anterior fat pad was resected.  At this time, attention was turned to the

patella.  Patella measured 22 and was resected to a 13 for a 29 x 9 mm patella.

Drill holes were then drilled for a press-fit patella and was found to be in

adequate position.  At this time, attention was turned to the femur.  A drill

hole was then drilled intramedullary at the insertion of the ACL just anterior

to the insertion of the PCL.  At this time, the intramedullary distal femoral

cutting guide was then placed and 8 mm was resected off the distal femur.  It

was found to be an adequate resection.  Sizing guide was then placed.  It was

found to be a size 5 femur.  Epicondylar axis holes were then drilled using

[place] line and epicondyles as reference.  A 4-in-1 cutting block was then

placed for the size 5 femur.  Anterior-posterior and anterior and posterior

chamfer cuts were then completed and found to be adequate.  Osteophytes were

then resected.  A box cut was then completed for a size 5 femur and was found to

be adequate.  Attention was turned to the tibia.  The posterior medial and

lateral retractors were placed, and the extramedullary tibial cutting guide was

then placed in the old footprint of the ACL.  This was aligned for the middle of

the ankle and roughly the second ray.  0-3 degrees of slope was also dialled in

and 9 mm was resected off the unaffected lateral side.  At this time, it was

found to be an adequate resection.  The medial and lateral meniscus were removed

along with any posterior osteophytes.  At this time, the size 4 baseplate was

found to have adequate coverage.  This was then stamped and drilled in proper

rotation for a press-fit tibia.  All trial components were then placed.  The

patient had knee full extension and flexion with no signs of varus/valgus

instability and patella was tracking centrally.  At this time, size 4 Universal

tibial baseplate press-fit was opened as well as size 5 press-fit femur, and a

29 x 9 mm press-fit patella.  The size 4 tibia was then impacted into place and

was found to have adequate fixation with no locking.  The size 5 femur was

impacted in place.  A 9 mm X3 PS polyethylene was then impacted into place, and

a 29 x 9 mm patella was press-fit into place.  The patient's knee had full range

of motion, was stable throughout, and the patella was tracking centrally.  At

this time, the patient's knee was brought into full extension.  The tourniquet

was deflated.  All bleeders were cauterized.  1 L dilute Betadine solution was

irrigated through the knee along with 3 L of pulse lavage irrigation with Ancef.

Topical tranexamic acid was placed along with 1 g of vancomycin powder in the

wound.  #2 barbed suture was used for closure of the medial and parapatellar

arthrotomy.  The periarticular injection was completed before this was done.  2-

0 Vicryl was used subcutaneously and running 3-0 Monocryl, and Prineo was used

for the

skin.  The patient tolerated the procedure well and was sent to the PACU in

stable condition.

 

DD:  09/13/2017 23:08:41

DT:  09/13/2017 23:42:17  MMODAL

Job #:  [id number]

## 2017-11-19 ENCOUNTER — HOSPITAL ENCOUNTER (EMERGENCY)
Dept: HOSPITAL 41 - JD.ED | Age: 67
Discharge: HOME | End: 2017-11-19
Payer: MEDICARE

## 2017-11-19 VITALS — DIASTOLIC BLOOD PRESSURE: 87 MMHG | SYSTOLIC BLOOD PRESSURE: 154 MMHG

## 2017-11-19 DIAGNOSIS — Z88.1: ICD-10-CM

## 2017-11-19 DIAGNOSIS — E66.9: ICD-10-CM

## 2017-11-19 DIAGNOSIS — E78.00: ICD-10-CM

## 2017-11-19 DIAGNOSIS — Z79.899: ICD-10-CM

## 2017-11-19 DIAGNOSIS — N39.0: Primary | ICD-10-CM

## 2017-11-19 DIAGNOSIS — I10: ICD-10-CM

## 2017-11-19 DIAGNOSIS — Z91.030: ICD-10-CM

## 2017-11-19 DIAGNOSIS — Z88.8: ICD-10-CM

## 2017-11-19 DIAGNOSIS — K21.9: ICD-10-CM

## 2017-11-19 NOTE — EDM.PDOC
ED HPI GENERAL MEDICAL PROBLEM





- General


Chief Complaint: Genitourinary Problem


Stated Complaint: BLOOD IN URINE


Time Seen by Provider: 11/19/17 13:23


Source of Information: Reports: Patient


History Limitations: Reports: No Limitations





- History of Present Illness


INITIAL COMMENTS - FREE TEXT/NARRATIVE: 





The patient is a 67-year-old female with a chief complaint of dysuria. She 

states that she's had frequent urinary tract infectionsand has already been 

treated with a couple of courses of antibiotics this year for urine infections. 

She initially completed a course of nitrofurantoin. She then took a course of 

Bactrim and finished this med a couple of weeks ago. For the past several days 

she's had no onset of dysuria and frequency and then today she noticed some 

hematuria so decided to come in. No fever. No vomiting. No flank pain. She does 

have some very low back pain that started after a car ride 3 days ago that is 

dull and constant and located in the right lower back area.


  ** Lower Back


Pain Score (Numeric/FACES): 5





- Related Data


 Allergies











Allergy/AdvReac Type Severity Reaction Status Date / Time


 


amlodipine [From Norvasc] Allergy  Itching Verified 11/19/17 13:09


 


bee venom protein (honey bee) Allergy  Edema Verified 11/19/17 13:09


 


amoxicillin AdvReac  Stomach Verified 11/19/17 13:09





   Upset  


 


cyclobenzaprine AdvReac  Hallucinati Verified 11/19/17 13:09





[From Flexeril]   ons  


 


erythromycin base AdvReac  Stomach Verified 11/19/17 13:09





   Upset  











Home Meds: 


 Home Meds





Aspirin/Acetaminophen/Caffeine [Migraine Relief Caplet] 2 tab PO Q6H PRN 09/08/ 17 [History]


Calcium Carbonate [Calcium] 1,200 mg PO DAILY 09/08/17 [History]


Hydrochlorothiazide 12.5 mg PO DAILY 09/08/17 [History]


Multivitamin [Daily Kinsey] 1 tab PO DAILY 09/08/17 [History]


Omeprazole Magnesium [Prilosec Otc] 20 mg PO DAILY 09/08/17 [History]


Simvastatin [Zocor] 20 mg PO BEDTIME 09/08/17 [History]


Cholecalciferol (Vitamin D3) [Vitamin D] 1 tab PO DAILY 09/11/17 [History]


Cephalexin 250 mg PO QID #56 capsule 11/19/17 [Rx]


Fish Oil/Omega-3 Fatty Acids [Fish Oil 1,000 MG] 1 cap PO BID 11/19/17 [History]











Past Medical History


HEENT History: Reports: Impaired Vision, Other (See Below)


Other HEENT History: wears glasses


Cardiovascular History: Reports: Heart Murmur, High Cholesterol, Hypertension, 

Other (See Below)


Other Cardiovascular History: mitral valve regurgitation


Respiratory History: Reports: Sleep Apnea


Gastrointestinal History: Reports: GERD


Genitourinary History: Reports: Other (See Below)


Other Genitourinary History: breast hypertrophy, UTI, frequency


OB/GYN History: Reports: None


Musculoskeletal History: Reports: Osteoarthritis, Osteoporosis, Other (See Below

)


Other Musculoskeletal History: degenerative joint disease, degenerative disc 

disease, bilateral shoulder pain, upper back pain


Neurological History: Reports: Migraines


Psychiatric History: Reports: Depression


Endocrine/Metabolic History: Reports: None, Obesity/BMI 30+


Hematologic History: Reports: None


Immunologic History: Reports: None


Oncologic (Cancer) History: Reports: None


Dermatologic History: Reports: None





- Past Surgical History


GI Surgical History: Reports: Cholecystectomy, Colonoscopy, EGD


Female  Surgical History: Reports: Breast Biopsy


Endocrine Surgical History: Reports: None


Neurological Surgical History: Reports: None


Musculoskeletal Surgical History: Reports: Other (See Below)


Other Musculoskeletal Surgeries/Procedures:: right total knee, right foot 

fusion with hardware


Oncologic Surgical History: Reports: None





Social & Family History





- Tobacco Use


Smoking Status *Q: Never Smoker


Second Hand Smoke Exposure: No





- Caffeine Use


Caffeine Use: Reports: None





- Alcohol Use


Days Per Week of Alcohol Use: 0 (1-2 per month)





- Recreational Drug Use


Recreational Drug Use: No





ED ROS GENERAL





- Review of Systems


Review Of Systems: See Below


Constitutional: Denies: Fever


HEENT: Reports: No Symptoms


Respiratory: Denies: Shortness of Breath, Cough


Cardiovascular: Denies: Chest Pain


Endocrine: Reports: No Symptoms


GI/Abdominal: Denies: Abdominal Pain


: Reports: Dysuria





ED EXAM, RENAL/





- Physical Exam


Exam: See Below


Exam Limited By: No Limitations


General Appearance: Alert, WD/WN, No Apparent Distress


Eye Exam: Bilateral Eye: Normal Inspection


Ears: Normal External Exam


Nose: Normal Inspection


Throat/Mouth: Normal Inspection, Normal Oropharynx, Normal Voice, No Airway 

Compromise


Head: Atraumatic, Normocephalic


Neck: Normal Inspection, Supple, Non-Tender, Full Range of Motion


Respiratory/Chest: No Respiratory Distress, Lungs Clear, Normal Breath Sounds


Cardiovascular: Normal Peripheral Pulses, Regular Rate, Rhythm


GI/Abdominal: Soft, Non-Tender, No Distention.  No: Rebound


Back Exam: Normal Inspection, Paraspinal Tenderness (low lumbar).  No: CVA 

Tenderness (L), CVA Tenderness (R)


Extremities: Normal Inspection


Neurological: Alert, Oriented, Normal Cognition


Psychiatric: Normal Affect, Normal Mood


Skin Exam: Warm, Dry, Intact, Normal Color, No Rash





Course





- Vital Signs


Last Recorded V/S: 


 Last Vital Signs











Temp  36.6 C   11/19/17 13:09


 


Pulse  78   11/19/17 13:09


 


Resp  17   11/19/17 13:09


 


BP  154/87 H  11/19/17 13:09


 


Pulse Ox  96   11/19/17 13:09














- Orders/Labs/Meds


Orders: 


 Active Orders 24 hr











 Category Date Time Status


 


 CULTURE URINE [RM] Stat Lab  11/19/17 13:25 Received











Labs: 


 Laboratory Tests











  11/19/17 Range/Units





  13:25 


 


Urine Color  Orange H  (Yellow)  


 


Urine Appearance  Clear  (Clear)  


 


Urine pH  5.0  (5.0-8.0)  


 


Ur Specific Gravity  1.020  (1.005-1.030)  


 


Urine Protein  2+ H  (Negative)  


 


Urine Glucose (UA)  Trace H  (Negative)  


 


Urine Ketones  Trace H  (Negative)  


 


Urine Occult Blood  Negative  (Negative)  


 


Urine Nitrite  Positive H  (Negative)  


 


Urine Bilirubin  1+ H  (Negative)  


 


Urine Urobilinogen  4.0 H  (0.2-1.0)  


 


Ur Leukocyte Esterase  3+ H  (Negative)  


 


Urine RBC  Not seen  (0-5)  /hpf


 


Urine WBC  5-10 H  (0-5)  /hpf


 


Ur Epithelial Cells  5-10 H  (0-5)  /hpf


 


Urine Bacteria  Rare  (FEW)  /hpf


 


Urine Mucus  Not seen  (FEW)  /hpf














- Re-Assessments/Exams


Free Text/Narrative Re-Assessment/Exam: 





11/19/17 14:35


UA is nitrite and LCE positive. Will treat for cystitis with cephalexin as her 

latest urine culture grew e.coli which was susceptible to cephalosporins. 

Advised her to f/u with PCP re: frequent infections.  Discussed return 

precautions. 





Departure





- Departure


Time of Disposition: 14:00


Disposition: Home, Self-Care 01


Clinical Impression: 


 UTI, Urinary tract infectious disease








- Discharge Information


Prescriptions: 


Cephalexin 250 mg PO QID #56 capsule


Instructions:  Urinary Tract Infection, Adult


Referrals: 


German Yancey MD [Primary Care Provider] - 


Forms:  ED Department Discharge


Additional Instructions: 


1.  Take antibiotic as prescribed


2.  Follow up with your primary care physician in 1-2 weeks 


3.  Return to the Emergency Department if you have worsening symptoms, fever, 

vomiting, worsening back pain, or any other concerning symptoms





- My Orders


Last 24 Hours: 


My Active Orders





11/19/17 13:25


CULTURE URINE [RM] Stat 














- Assessment/Plan


Last 24 Hours: 


My Active Orders





11/19/17 13:25


CULTURE URINE [RM] Stat

## 2018-04-12 ENCOUNTER — HOSPITAL ENCOUNTER (EMERGENCY)
Dept: HOSPITAL 41 - JD.ED | Age: 68
Discharge: HOME | End: 2018-04-12
Payer: MEDICARE

## 2018-04-12 VITALS — DIASTOLIC BLOOD PRESSURE: 73 MMHG | SYSTOLIC BLOOD PRESSURE: 140 MMHG

## 2018-04-12 DIAGNOSIS — E66.9: ICD-10-CM

## 2018-04-12 DIAGNOSIS — E78.00: ICD-10-CM
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
## 2018-04-12 DIAGNOSIS — K21.9: ICD-10-CM

## 2018-04-12 DIAGNOSIS — I10: ICD-10-CM

## 2018-04-12 DIAGNOSIS — S01.01XA: Primary | ICD-10-CM

## 2018-04-12 DIAGNOSIS — Z91.030: ICD-10-CM
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
## 2018-04-12 DIAGNOSIS — Z88.1: ICD-10-CM

## 2018-04-12 DIAGNOSIS — Z79.899: ICD-10-CM

## 2018-04-12 NOTE — EDM.PDOC
ED HPI GENERAL MEDICAL PROBLEM





- General


Chief Complaint: Head Injury


Stated Complaint: HEAD INJURY


Time Seen by Provider: 04/12/18 10:24


Source of Information: Reports: Patient, Family (Daughter)


History Limitations: Reports: No Limitations





- History of Present Illness


INITIAL COMMENTS - FREE TEXT/NARRATIVE: 





The patient states that she tripped on an electric cord at home around 09:40 

this morning, falling against the corner of a wall, striking the right side of 

her head. There was no loss of consciousness. The patient complains of a 

headache to the area. No prior head injury. The patient is otherwise uninjured.





The patient states that her last tetanus vaccination was 2 or 3 years ago.





The patient's PCP is Dr. Yancey.








  ** Right Head


Pain Score (Numeric/FACES): 6





- Related Data


 Allergies











Allergy/AdvReac Type Severity Reaction Status Date / Time


 


amlodipine [From Norvasc] Allergy  Itching Verified 04/12/18 10:26


 


bee venom protein (honey bee) Allergy  Edema Verified 04/12/18 10:26


 


amoxicillin AdvReac  Stomach Verified 04/12/18 10:26





   Upset  


 


erythromycin base AdvReac  Stomach Verified 04/12/18 10:26





   Upset  











Home Meds: 


 Home Meds





Aspirin/Acetaminophen/Caffeine [Migraine Relief Caplet] 2 tab PO Q6H PRN 09/08/ 17 [History]


Calcium Carbonate [Calcium] 1,200 mg PO DAILY 09/08/17 [History]


Hydrochlorothiazide 12.5 mg PO DAILY 09/08/17 [History]


Multivitamin [Daily Kinsey] 1 tab PO DAILY 09/08/17 [History]


Omeprazole Magnesium [Prilosec Otc] 20 mg PO DAILY 09/08/17 [History]


Simvastatin [Zocor] 20 mg PO BEDTIME 09/08/17 [History]


Cholecalciferol (Vitamin D3) [Vitamin D] 1 tab PO DAILY 09/11/17 [History]


Cephalexin 250 mg PO QID #56 capsule 11/19/17 [Rx]


Fish Oil/Omega-3 Fatty Acids [Fish Oil 1,000 MG] 1 cap PO BID 11/19/17 [History]











Past Medical History


HEENT History: Reports: Impaired Vision


Other HEENT History: wears glasses


Cardiovascular History: Reports: High Cholesterol, Hypertension


Respiratory History: Reports: Sleep Apnea


Gastrointestinal History: Reports: GERD


Musculoskeletal History: Reports: Osteoarthritis, Osteoporosis


Psychiatric History: Reports: Depression


Endocrine/Metabolic History: Reports: None, Obesity/BMI 30+





- Past Surgical History


GI Surgical History: Reports: Cholecystectomy, Colonoscopy, EGD


Female  Surgical History: Reports: Breast Biopsy (right, benign)


Musculoskeletal Surgical History: Reports: Knee Replacement (bilateral), Other (

See Below) (Right foot fusion)





Social & Family History





- Family History


Family Medical History: Noncontributory





- Tobacco Use


Smoking Status *Q: Never Smoker


Second Hand Smoke Exposure: No





- Caffeine Use


Caffeine Use: Reports: None





- Alcohol Use


Alcohol Use History: Yes


Days Per Week of Alcohol Use: 0 (1-2 per month)


Alcohol Use Frequency: Socially





- Recreational Drug Use


Recreational Drug Use: No





- Living Situation & Occupation


Living situation: Reports: , Alone ( in [place])


Occupation: Retired





ED ROS GENERAL





- Review of Systems


Review Of Systems: ROS reveals no pertinent complaints other than HPI.





ED EXAM, HEAD INJURY





- Physical Exam


Exam: See Below


Exam Limited By: No Limitations


General Appearance: Alert, WD/WN, No Apparent Distress


Head: Normocephalic, Scalp Lacerations (10 cm boomerang-shaped laceration to 

the right scalp)


Eyes: Bilateral Eye: EOMI, Normal Inspection


Ears: Normal External Exam, Hearing Grossly Normal


Nose: Normal Inspection, No Blood


Throat/Mouth: Normal Inspection, Normal Lips, Normal Voice, No Airway Compromise


Neck: Non-Tender, Full Range of Motion, Normal Alignment, Normal Inspection


Respiratory: No Respiratory Distress, Lungs Clear, Normal Breath Sounds, No 

Accessory Muscle Use


Cardiovascular: Normal Peripheral Pulses, Regular Rate, Rhythm, No Gallop, No 

JVD, No Murmur, No Rub


GI/Abdominal Exam: Normal Bowel Sounds, Soft, Non-Tender, No Organomegaly, No 

Distention, No Abnormal Bruit, No Mass


 (Female) Exam: Deferred


Rectal (Female) Exam: Deferred


Back Exam: Full Range of Motion, Normal Inspection, NT


Extremities: Normal Inspection, Normal Range of Motion, No Pedal Edema, Normal 

Capillary Refill


Neurologic: CNs II-XII nml As Tested, No Motor/Sensory Deficits, Alert, 

Oriented x 3


Skin: Normal Color, Warm/Dry





ED LACERATION/WOUND & COREY PROC





- Laceration/Wound Repair


  ** Right Head


Lac/wound length in cm: 10


Appearance: Subcutaneous, Irregular, Clean


Distal NVT: Neuro & Vascular Intact


Anesthetic Type: Local


Local Anesthesia - Lidocaine (Xylocaine): 1% with EPI (50:50 admixture)


Local Anesthesia - Bupivicaine (Marcaine): 0.5% Plain (50:50 admixture)


Local Anesthetic Volume: Other (10 ml)


Skin Prep: Saline


Exploration/Debridement/Repair: Wound Explored, Explored to Base, No Foreign 

Material Found, Wound Margins Revised


Closed with: Staples


# of Sutures: 15


Sterile Dressing Applied: Nurse


Tetanus Status Addressed: Yes


Complications: No





Course





- Vital Signs


Last Recorded V/S: 


 Last Vital Signs











Temp  36.1 C   04/12/18 10:21


 


Pulse  82   04/12/18 10:21


 


Resp  18   04/12/18 10:21


 


BP  164/87 H  04/12/18 10:21


 


Pulse Ox  95   04/12/18 10:21














- Orders/Labs/Meds


Meds: 


Medications














Discontinued Medications














Generic Name Dose Route Start Last Admin





  Trade Name Freq  PRN Reason Stop Dose Admin


 


Bupivacaine HCl  10 ml  04/12/18 11:04  04/12/18 11:15





  Sensorcaine-Mpf 0.5%  INJECT  04/12/18 11:05  10 ml





  ONETIME ONE   Administration





     





     





     





     


 


Lidocaine/Epinephrine  5 ml  04/12/18 11:04  04/12/18 11:14





  Xylocaine-Mpf 1.5% W/Epinephrine 1:200,000  INJECT  04/12/18 11:05  Not Given





  ONETIME ONE   





     





     





     





     


 


Lidocaine/Epinephrine  Confirm  04/12/18 11:16  04/12/18 11:15





  Xylocaine 1% With Epinephrine 1:100,000  Administered  04/12/18 11:17  Not 

Given





  Dose   





  20 ml   





  .ROUTE   





  .STK-MED ONE   





     





     





     





     


 


Lidocaine/Epinephrine  20 ml  04/12/18 11:14  04/12/18 11:15





  Xylocaine 1% With Epinephrine 1:100,000  INJECT  04/12/18 11:15  20 ml





  ONETIME ONE   Administration





     





     





     





     














- Re-Assessments/Exams


Free Text/Narrative Re-Assessment/Exam: 





04/12/18 11:24


The patient's right scalp laceration was anesthetized with 10 ml of a 50-50 

admixture of lidocaine 1% with epinephrine and bupivacaine 0.5% without 

epinephrine per PA student Yanique Rivas. The wound was then closed using 15 

staples, also per PA em Rivas. The patient tolerated the 

procedure well.





Departure





- Departure


Time of Disposition: 11:26


Disposition: Home, Self-Care 01


Condition: Good


Clinical Impression: 


 Fall at home, Scalp laceration








- Discharge Information


Referrals: 


German Yancey MD [Primary Care Provider] - 


Forms:  ED Department Discharge


Additional Instructions: 


You were seen in the emergency room after falling at home, cutting the right 

side of your scalp on the corner of a wall.





You received a total of 15 staples to the wound.





Keep the wound clean with ordinary shampoo and water. Antibiotic ointment is 

not necessary, and you should not put any product in your hair.





You should expect that the wound will weep bloody fluid for the next two or 

three days. Keep this in mind when choosing bedsheets.





Take over-the-counter Tylenol or ibuprofen as needed for discomfort.





The staples should be ready for removal by Thursday, 4/19/2018. This can be 

done at a walk-in clinic, by a nurse at your doctor's office, or at an ER.





If any other problems, please do not hesitate to return to the ER.

## 2018-04-13 ENCOUNTER — HOSPITAL ENCOUNTER (EMERGENCY)
Dept: HOSPITAL 41 - JD.ED | Age: 68
Discharge: HOME | End: 2018-04-13
Payer: MEDICARE

## 2018-04-13 VITALS — SYSTOLIC BLOOD PRESSURE: 141 MMHG | DIASTOLIC BLOOD PRESSURE: 72 MMHG

## 2018-04-13 DIAGNOSIS — Z79.899: ICD-10-CM
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
## 2018-04-13 DIAGNOSIS — Z88.8: ICD-10-CM

## 2018-04-13 DIAGNOSIS — S01.01XA: Primary | ICD-10-CM

## 2018-04-13 DIAGNOSIS — Z88.1: ICD-10-CM

## 2018-04-13 DIAGNOSIS — R31.0: ICD-10-CM

## 2018-04-13 PROCEDURE — 99283 EMERGENCY DEPT VISIT LOW MDM: CPT

## 2018-04-13 PROCEDURE — 81001 URINALYSIS AUTO W/SCOPE: CPT

## 2018-04-13 PROCEDURE — 12004 RPR S/N/AX/GEN/TRK7.6-12.5CM: CPT

## 2018-04-13 NOTE — EDM.PDOC
<Yanique Rivas - Last Filed: 04/13/18 18:49>





ED HPI GENERAL MEDICAL PROBLEM





- General


Chief Complaint: General


Stated Complaint: DRESSING IS STICKING


Time Seen by Provider: 04/13/18 17:23





- Related Data


 Allergies











Allergy/AdvReac Type Severity Reaction Status Date / Time


 


amlodipine [From Norvasc] Allergy  Itching Verified 04/13/18 17:20


 


bee venom protein (honey bee) Allergy  Edema Verified 04/13/18 17:20


 


amoxicillin AdvReac  Stomach Verified 04/13/18 17:20





   Upset  


 


erythromycin base AdvReac  Stomach Verified 04/13/18 17:20





   Upset  











Home Meds: 


 Home Meds





Aspirin/Acetaminophen/Caffeine [Migraine Relief Caplet] 2 tab PO Q6H PRN 09/08/ 17 [History]


Calcium Carbonate [Calcium] 1,200 mg PO DAILY 09/08/17 [History]


Hydrochlorothiazide 12.5 mg PO DAILY 09/08/17 [History]


Multivitamin [Daily Kinsey] 1 tab PO DAILY 09/08/17 [History]


Omeprazole Magnesium [Prilosec Otc] 20 mg PO DAILY 09/08/17 [History]


Simvastatin [Zocor] 20 mg PO BEDTIME 09/08/17 [History]


Cholecalciferol (Vitamin D3) [Vitamin D] 1 tab PO DAILY 09/11/17 [History]


Fish Oil/Omega-3 Fatty Acids [Fish Oil 1,000 MG] 1 cap PO BID 11/19/17 [History]


Cranberry 500 mg PO DAILY 04/13/18 [History]











ED GENERAL MEDICAL PROCEDURES





- Laceration/Wound Repair


  ** Right Head


Lac/wound length in cm: 10


Anesthetic Type: Local


Local Anesthesia - Lidocaine (Xylocaine): 1% Plain


Local Anesthetic Volume: 3cc


Skin Prep: Chlorhexidine (Hibiciens), Saline


Closed with: Sutures


Suture Size: 4-0


Suture Type: Interrupted, Simple, Other (vicryl)


Sterile Dressing Applied: Nurse


Progress/Comments: 





Laceration repaired with staples were in place from yesterday. The wound was 

continuing to bleed so 3 sutures were inserted approximately in the middle of 

the laceration. 





Course





- Vital Signs


Last Recorded V/S: 


 Last Vital Signs











Temp  97.9 F   04/13/18 17:18


 


Pulse  75   04/13/18 17:18


 


Resp  18   04/13/18 17:18


 


BP  141/72 H  04/13/18 17:18


 


Pulse Ox  94 L  04/13/18 17:18














- Orders/Labs/Meds


Orders: 


 Active Orders 24 hr











 Category Date Time Status


 


 UA W/MICROSCOPIC [URIN] Stat Lab  04/13/18 18:55 Ordered











Labs: 


 Laboratory Tests











  04/13/18 Range/Units





  18:55 


 


Urine Color  Light yellow  (Yellow)  


 


Urine Appearance  Clear  (Clear)  


 


Urine pH  6.5  (5.0-8.0)  


 


Ur Specific Gravity  1.010  (1.005-1.030)  


 


Urine Protein  Negative  (Negative)  


 


Urine Glucose (UA)  Negative  (Negative)  


 


Urine Ketones  Negative  (Negative)  


 


Urine Occult Blood  1+ H  (Negative)  


 


Urine Nitrite  Negative  (Negative)  


 


Urine Bilirubin  Negative  (Negative)  


 


Urine Urobilinogen  0.2  (0.2-1.0)  


 


Ur Leukocyte Esterase  Negative  (Negative)  


 


Urine RBC  0-5  (0-5)  /hpf


 


Urine WBC  0-5  (0-5)  /hpf


 


Ur Epithelial Cells  0-5  (0-5)  /hpf


 


Urine Bacteria  Few  (FEW)  /hpf


 


Urine Mucus  Not seen  (FEW)  /hpf











Meds: 


Medications














Discontinued Medications














Generic Name Dose Route Start Last Admin





  Trade Name Sanyaq  PRN Reason Stop Dose Admin


 


Lidocaine HCl  Confirm  04/13/18 18:31  





  Xylocaine 1%  Administered  04/13/18 18:32  





  Dose   





  50 ml   





  .ROUTE   





  .STK-MED ONE   





     





     





     





     


 


Lidocaine/Epinephrine  20 ml  04/13/18 18:30  





  Xylocaine 1% With Epinephrine 1:100,000  INJECT  04/13/18 18:31  





  ONETIME ONE   





     





     





     





     


 


Lidocaine/Tetracaine  1 ml  04/13/18 17:52  04/13/18 17:56





  Let Soln  TOP  04/13/18 17:53  1 ml





  ONETIME ONE   Administration





     





     





     





     














Departure





- Departure


Disposition: Home, Self-Care 01


Clinical Impression: 


 Bleeding from wound





Scalp laceration


Qualifiers:


 Encounter type: subsequent encounter Qualified Code(s): S01.01XD - Laceration 

without foreign body of scalp, subsequent encounter





Hematuria


Qualifiers:


 Hematuria type: gross Qualified Code(s): R31.0 - Gross hematuria








- Discharge Information


Instructions:  Hematuria, Adult


Referrals: 


German Yancey MD [Primary Care Provider] - 


Forms:  ED Department Discharge


Additional Instructions: 


Leave bandage on your laceration tonight, you may remove this tomorrow and 

shower.  Do not rub or massage the laceration, you may let the water run over 

it but not directly on it.


Monitor for any return of bleeding.  Certainly if bleeding should return you 

should come back into the emergency room.





Your urinalysis demonstrated some blood in the urine but no sign of infection.  

I recommend that you increase your overall fluid intake.  Follow up with your 

primary provider to repeat this in a few weeks or follow up sooner if you have 

any pain.





- My Orders


Last 24 Hours: 


My Active Orders





04/13/18 18:55


UA W/MICROSCOPIC [URIN] Stat 














- Assessment/Plan


Last 24 Hours: 


My Active Orders





04/13/18 18:55


UA W/MICROSCOPIC [URIN] Stat 














<Devi Rahman L - Last Filed: 04/13/18 19:21>





ED HPI GENERAL MEDICAL PROBLEM





- General


Source of Information: Reports: Patient





- History of Present Illness


INITIAL COMMENTS - FREE TEXT/NARRATIVE: 


Patient is here for evaluation of persistent bleeding to a scalp laceration.  

This laceration was stapled here in the emergency room yesterday after patient 

fell and hit her head on the corner of the wall.  15 staples were placed.


Patient states that this has continued to bleed since that time.  She is not on 

any blood thinners.  She has not had difficulty with significant bleeding or 

bruising previously.





Patient also questioning possible UTI, she states that she has had some 

hematuria.  She denies any pelvic or flank pain.  She denies any dysuria or 

frequency.








Past Medical History


HEENT History: Reports: Impaired Vision


Other HEENT History: wears glasses


Cardiovascular History: Reports: High Cholesterol, Hypertension


Other Cardiovascular History: mitral valve regurgitation


Respiratory History: Reports: Sleep Apnea


Gastrointestinal History: Reports: GERD


Genitourinary History: Reports: UTI, Recurrent, Other (See Below)


Other Genitourinary History: breast hypertrophy, frequency


OB/GYN History: Reports: None


Musculoskeletal History: Reports: Osteoarthritis, Osteoporosis


Other Musculoskeletal History: degenerative joint disease, degenerative disc 

disease, bilateral shoulder pain, upper back pain


Neurological History: Reports: Concussion, Migraines


Psychiatric History: Reports: Depression


Endocrine/Metabolic History: Reports: None, Obesity/BMI 30+


Hematologic History: Reports: None


Immunologic History: Reports: None


Oncologic (Cancer) History: Reports: None


Dermatologic History: Reports: None





- Past Surgical History


GI Surgical History: Reports: Cholecystectomy, Colonoscopy, EGD


Female  Surgical History: Reports: Breast Biopsy


Neurological Surgical History: Reports: None


Musculoskeletal Surgical History: Reports: Knee Replacement, Other (See Below)


Oncologic Surgical History: Reports: None





Social & Family History





- Family History


Family Medical History: Noncontributory





- Tobacco Use


Smoking Status *Q: Never Smoker


Second Hand Smoke Exposure: No





- Caffeine Use


Caffeine Use: Reports: None





- Alcohol Use


Days Per Week of Alcohol Use: 0 (1-2 per month)





- Recreational Drug Use


Recreational Drug Use: No





- Living Situation & Occupation


Living situation: Reports: , Alone ( in [place])


Occupation: Retired





ED ROS GENERAL





- Review of Systems


Review Of Systems: See Below


Constitutional: Reports: No Symptoms


Cardiovascular: Reports: No Symptoms


Endocrine: Reports: No Symptoms


Skin: Reports: Other (Laceration with persistent bleeding to right side of 

scalp.)


Neurological: Reports: No Symptoms


Psychiatric: Reports: No Symptoms


Hematologic/Lymphatic: Denies: Easy Bleeding, Easy Bruising





ED EXAM, GENERAL





- Physical Exam


Exam: See Below


Exam Limited By: No Limitations


General Appearance: Alert, WD/WN, No Apparent Distress


Neurological: Alert, Oriented, No Motor/Sensory Deficits


Psychiatric: Normal Affect, Normal Mood


Skin Exam: Warm, Dry, Other (Large laceration to right scalp with staples in 

place.  Localized area of pulsatile bleeding due to having to staples 

approximately 3 cm from the anterior aspect of the laceration.)





Course





- Orders/Labs/Meds


Orders: 


 Active Orders 24 hr











 Category Date Time Status


 


 UA W/MICROSCOPIC [URIN] Stat Lab  04/13/18 18:55 Ordered











Labs: 


 Laboratory Tests











  04/13/18 Range/Units





  18:55 


 


Urine Color  Light yellow  (Yellow)  


 


Urine Appearance  Clear  (Clear)  


 


Urine pH  6.5  (5.0-8.0)  


 


Ur Specific Gravity  1.010  (1.005-1.030)  


 


Urine Protein  Negative  (Negative)  


 


Urine Glucose (UA)  Negative  (Negative)  


 


Urine Ketones  Negative  (Negative)  


 


Urine Occult Blood  1+ H  (Negative)  


 


Urine Nitrite  Negative  (Negative)  


 


Urine Bilirubin  Negative  (Negative)  


 


Urine Urobilinogen  0.2  (0.2-1.0)  


 


Ur Leukocyte Esterase  Negative  (Negative)  


 


Urine RBC  0-5  (0-5)  /hpf


 


Urine WBC  0-5  (0-5)  /hpf


 


Ur Epithelial Cells  0-5  (0-5)  /hpf


 


Urine Bacteria  Few  (FEW)  /hpf


 


Urine Mucus  Not seen  (FEW)  /hpf











Meds: 


Medications














Discontinued Medications














Generic Name Dose Route Start Last Admin





  Trade Name Whitney  PRN Reason Stop Dose Admin


 


Lidocaine HCl  Confirm  04/13/18 18:31  





  Xylocaine 1%  Administered  04/13/18 18:32  





  Dose   





  50 ml   





  .ROUTE   





  .STK-MED ONE   





     





     





     





     


 


Lidocaine/Epinephrine  20 ml  04/13/18 18:30  





  Xylocaine 1% With Epinephrine 1:100,000  INJECT  04/13/18 18:31  





  ONETIME ONE   





     





     





     





     


 


Lidocaine/Tetracaine  1 ml  04/13/18 17:52  04/13/18 17:56





  Let Soln  TOP  04/13/18 17:53  1 ml





  ONETIME ONE   Administration





     





     





     





     














- Re-Assessments/Exams


Free Text/Narrative Re-Assessment/Exam: 


Pulsatile bleeding to specific area of her laceration.


Quick clot was initially tried, the patient soaked through that gauze quite 

quickly.


Area was anesthetized and 3 stitches were placed by JIMMY Rivas 

where the bleeding persisted.  Patient was monitored and bleeding did not 

return to this area.  


Dressing was applied, patient will leave this on until tomorrow.  She'll follow-

up with her PCP or return to emergency room if bleeding should resume.





Urinalysis demonstrates 1+ hematuria but no sign of infection.  She has no 

pelvic or flank pain.  I recommend the patient increase her overall fluid 

intake and have this rechecked by her PCP in a few weeks.





04/13/18 18:02





04/13/18 19:14





04/13/18 19:21








Departure





- Departure


Time of Disposition: 19:20


Condition: Good





- My Orders


Last 24 Hours: 


My Active Orders





04/13/18 18:55


UA W/MICROSCOPIC [URIN] Stat 














- Assessment/Plan


Last 24 Hours: 


My Active Orders





04/13/18 18:55


UA W/MICROSCOPIC [URIN] Stat

## 2020-12-01 NOTE — PCM.DCSUM1
**Discharge Summary





- Hospital Course


Brief History: Chelsea is a 68 yo female who underwent left TKA with Dr. Perales on 

9-.  The procedure was completed under spinal anesthesia with sedation.  

The pt tolerated the procedure well and was admitted to the [facility] 

[facility].  Medical management was provided by the Hospitalist service.  The pt's 

Hospital course was uneventful.  The pt remained in Hospital until POD#2 due to 

need for monitoring of oxygen saturations.  The pt's Hgb on POD#1 was 10.3.  On 

POD#1, 325mg BID was initiated for VTE prophylaxis.  SCDs and TEDs were also 

ordered.  A Mepilex dressing was placed at the incision site at the time of 

surgery and remained clean and dry.  The pt participated in P.T. and O.T. and 

progressed well.  The pt was allowed to WBAT.  On POD#2, the pt was deemed 

appropriate to discharge to home.





- Discharge Data


Discharge Date: 09/13/17


Discharge Disposition: Home, Self-Care 01


Condition: Good





- Patient Summary/Data


Consults: 


 Consultations





09/11/17 07:29


Consult to Physician [CONS] Routine 


OT Evaluation and Treatment [CONS] Routine 





09/11/17 07:36


PT Evaluation and Treatment [CONS] Routine 














- Patient Instructions


Diet: Usual Diet as Tolerated


Activity: Apply Ice, As Tolerated, Elevate Extremity, Full Weight Bearing


Driving: Do Not Drive


Showering/Bathing: May Shower


Wound/Incision Care: Keep Operative Site/Wound Site Clean and Dry, Do NOT 

Change Dressing


Notify Provider of: Fever, Increased Pain, Swelling and Redness, Drainage, 

Nausea and/or Vomiting


Other/Special Instructions: Please get up and moving around every hour while 

awake.  This helps to prevent blood clots.  Have help with mobility as needed 

and use the walker or crutches.  Please take a 325mg aspirin TWICE daily.  This 

helps to prevent blood clots.  The medication is being used for blood clot 

prevention and not for pain management, so please do not miss a dose of the 

medication.  Wear the LUIZ hose during the day and you may remove these at 

night.  Schedule for physical therapy.  Complete the exercises that were taught 

in the Hospital until you are able to attend P.T.  Elevate the limb to decrease 

swelling.  Use the ice machine often.  Use the pain medication as needed.  The 

medication may cause drowsiness and/or constipation. If you notice increased 

drowsiness, please try to increase the time between doses or try 1 pill or 1/2 

tablet.  You could use a stool softener like docusate sodium or Colace 100mg 

twice daily and/or a laxative like Miralax daily.  Contact your primary care 

provider for other instructions if you are constipated.  Use the incentive 

spirometer often.  Keep the dressing at the surgical site in place until follow-

up at the Clinic.  Please call the Clinic with questions or concerns  [phone].





- Discharge Plan


Prescriptions/Med Rec: 


Acetaminophen/oxyCODONE [Percocet 325-5 MG] 1 - 2 tab PO Q4H PRN #60 tablet


 PRN Reason: Pain


Aspirin [Ecotrin] 325 mg PO BID #56 tab.ec


Cyclobenzaprine [Flexeril] 10 mg PO TID PRN #40 tablet


 PRN Reason: Spasms


Ondansetron [Zofran ODT] 4 mg PO Q6H PRN #20 tab.dis


 PRN Reason: Nausea


Home Medications: 


 Home Meds





Aspirin/Acetaminophen/Caffeine [Migraine Relief Caplet] 2 tab PO Q6H PRN 09/08/ 17 [History]


Calcium Carbonate [Calcium] 1,200 mg PO DAILY 09/08/17 [History]


Hydrochlorothiazide 12.5 mg PO DAILY 09/08/17 [History]


Multivitamin [Daily Kinsey] 1 tab PO DAILY 09/08/17 [History]


Omeprazole Magnesium [Prilosec Otc] 20 mg PO DAILY 09/08/17 [History]


Simvastatin [Zocor] 20 mg PO BEDTIME 09/08/17 [History]


Cholecalciferol (Vitamin D3) [Vitamin D] 1 tab PO DAILY 09/11/17 [History]


Acetaminophen/oxyCODONE [Percocet 325-5 MG] 1 - 2 tab PO Q4H PRN #60 tablet 09/ 13/17 [Rx]


Aspirin [Ecotrin] 325 mg PO BID #56 tab.ec 09/13/17 [Rx]


Bisacodyl [Dulcolax] 5 mg PO DAILY PRN  tablet 09/13/17 [Rx]


Cyclobenzaprine [Flexeril] 10 mg PO TID PRN #40 tablet 09/13/17 [Rx]


Docusate Sodium [Colace] 100 mg PO BID  cap 09/13/17 [Rx]


Magnesium Hydroxide [Milk of Magnesia] 30 ml PO BID PRN  cup 09/13/17 [Rx]


Ondansetron [Zofran ODT] 4 mg PO Q6H PRN #20 tab.dis 09/13/17 [Rx]


Sennosides [Senna] 8.6 mg PO BID PRN  tablet 09/13/17 [Rx]


traMADol HCl [Tramadol HCl] 50 mg PO QID PRN #0 09/13/17 [Rx]








Patient Handouts:  Total Knee Replacement, Care After, Easy-to-Read, Total Knee 

Replacement, Easy-to-Read, Knee Rehabilitation Guidelines Following Surgery


Referrals: 


Yokasta Ruiz PA-C [Physician Assistant] - 09/19/17 9:30 am (Please check in 

at 9:15am)





- Patient Data


Vitals - Most Recent: 


 Last Vital Signs











Temp  97.7 F   09/13/17 08:29


 


Pulse  82   09/13/17 08:29


 


Resp  24 H  09/13/17 08:29


 


BP  150/52 H  09/13/17 08:29


 


Pulse Ox  93 L  09/13/17 08:32











Weight - Most Recent: 237 lb 1 oz


I&O - Last 24 hours: 


 Intake & Output











 09/12/17 09/13/17 09/13/17





 22:59 06:59 14:59


 


Intake Total 1430 600 120


 


Output Total 250 700 


 


Balance 1180 -100 120











Lab Results - Last 24 hrs: 


 Laboratory Results - last 24 hr











  09/13/17 09/13/17 Range/Units





  07:10 07:10 


 


WBC  9.47   (3.98-10.04)  K/mm3


 


RBC  3.12 L   (3.98-5.22)  M/mm3


 


Hgb  9.6 L   (11.2-15.7)  gm/L


 


Hct  28.6 L   (34.1-44.9)  %


 


MCV  91.7   (79.4-94.8)  fl


 


MCH  30.8   (25.6-32.2)  pg


 


MCHC  33.6   (32.2-35.5)  g/dl


 


RDW Std Deviation  43.7   (36.4-46.3)  fL


 


Plt Count  155 L   (182-369)  K/mm3


 


MPV  11.7   (9.4-12.3)  fl


 


Neut % (Auto)  72.6 H   (34.0-71.1)  %


 


Lymph % (Auto)  13.6 L   (19.3-51.7)  %


 


Mono % (Auto)  11.2   (4.7-12.5)  %


 


Eos % (Auto)  2.3   (0.7-5.8)  


 


Baso % (Auto)  0.1   (0.1-1.2)  %


 


Neut # (Auto)  6.87 H   (1.56-6.13)  K/mm3


 


Lymph # (Auto)  1.29   (1.18-3.74)  K/mm3


 


Mono # (Auto)  1.06 H   (0.24-0.36)  K/mm3


 


Eos # (Auto)  0.22   (0.04-0.36)  K/mm3


 


Baso # (Auto)  0.01   (0.01-0.08)  K/mm3


 


Sodium   131 L  (136-145)  mEq/L


 


Potassium   3.4 L  (3.5-5.1)  mEq/L


 


Chloride   97 L  ()  mEq/L


 


Carbon Dioxide   31  (21-32)  mEq/L


 


Anion Gap   6.4  (5-15)  


 


BUN   11  (7-18)  mg/dL


 


Creatinine   0.7  (0.55-1.02)  mg/dL


 


Est Cr Clr Drug Dosing   58.04  mL/min


 


Estimated GFR (MDRD)   > 60  (>60)  mL/min


 


BUN/Creatinine Ratio   15.7  (14-18)  


 


Glucose   106  ()  mg/dL


 


Calcium   8.0 L  (8.5-10.1)  mg/dL











Med Orders - Current: 


 Current Medications





Aspirin (Ecotrin)  325 mg PO BID [facility]


   Last Admin: 09/13/17 08:51 Dose:  325 mg


Bisacodyl (Dulcolax)  5 mg PO DAILY PRN


   PRN Reason: Constipation


   Last Admin: 09/13/17 10:51 Dose:  5 mg


Calcium Carbonate/Glycine (Calcium Carbonate)  1,200 mg PO DAILY [facility]


   Last Admin: 09/13/17 08:51 Dose:  1,200 mg


Cholecalciferol (Vitamin D3)  5,000 units PO DAILY [facility]


   Last Admin: 09/13/17 08:52 Dose:  5,000 units


Cyclobenzaprine HCl (Flexeril)  10 mg PO TID PRN


   PRN Reason: Spasms


Docusate Sodium (Colace)  100 mg PO BID [facility]


   Last Admin: 09/13/17 08:51 Dose:  100 mg


Hydrochlorothiazide (Hydrochlorothiazide)  12.5 mg PO DAILY [facility]


   Last Admin: 09/13/17 08:51 Dose:  12.5 mg


Magnesium Hydroxide (Milk Of Magnesia)  30 ml PO BID PRN


   PRN Reason: Constipation


Miscellaneous Information (Remove Patch)  0 ea TRDERM ONETIME ONE


   Stop: 09/14/17 12:31


Morphine Sulfate (Morphine)  2 mg IVPUSH Q2H PRN


   PRN Reason: Breakthrough Pain


   Last Admin: 09/12/17 16:16 Dose:  2 mg


Naloxone HCl (Narcan)  0.1 mg IVPUSH Q5M PRN


   PRN Reason: Oversedation


Ondansetron HCl (Zofran)  4 mg IVPUSH Q6H PRN


   PRN Reason: Nausea/Vomiting


   Last Admin: 09/13/17 09:38 Dose:  4 mg


Oxycodone/Acetaminophen (Percocet 325-5 Mg)  1 - 2 tab PO Q4H PRN


   PRN Reason: Pain


   Last Admin: 09/13/17 06:03 Dose:  2 tab


Pantoprazole Sodium (Protonix***)  40 mg PO DAILY [facility]


   Last Admin: 09/13/17 08:52 Dose:  Not Given


Senna (Senna)  8.6 mg PO BID PRN


   PRN Reason: Constipation


   Last Admin: 09/13/17 10:51 Dose:  8.6 mg


Simvastatin (Zocor)  20 mg PO BEDTIME [facility]


   Last Admin: 09/12/17 21:58 Dose:  20 mg





Discontinued Medications





Bupivacaine HCl (Marcaine 0.25%) Confirm Administered Dose 30 ml .ROUTE .STK-

MED ONE


   Stop: 09/11/17 12:20


   Last Admin: 09/11/17 14:32 Dose:  30 ml


Cefazolin Sodium (Ancef) Confirm Administered Dose 2 gm .ROUTE .STK-MED ONE


   Stop: 09/11/17 11:23


   Last Admin: 09/11/17 14:17 Dose:  2 gm


Cefazolin Sodium (Ancef) Confirm Administered Dose 2 gm .ROUTE .STK-MED ONE


   Stop: 09/11/17 12:20


Morphine Sulfate 8 mg/Epinephrine HCl 0.3 mg/Cefuroxime Sodium 750 mg/Ketorolac 

Tromethamine 30 mg/Sodium Chloride 27.9 ml  0 mg .XX ONETIME ONE


   Stop: 09/11/17 12:01


   Last Admin: 09/11/17 18:21 Dose:  Not Given


Diphenhydramine HCl (Benadryl)  25 mg IVPUSH Q4H PRN


   PRN Reason: Nausea


Diphenhydramine HCl (Benadryl)  25 mg IVPUSH Q6H PRN


   PRN Reason: Pruritis


   Stop: 09/11/17 18:00


Diphenhydramine HCl (Benadryl)  25 mg PO ONETIME ONE


   Stop: 09/11/17 22:31


   Last Admin: 09/11/17 22:39 Dose:  25 mg


Ephedrine Sulfate (Ephedrine Sulfate) Confirm Administered Dose 50 mg .ROUTE 

.STK-MED ONE


   Stop: 09/11/17 11:23


Famotidine (Pepcid)  20 mg PO Q12H [facility]


Famotidine (Pepcid) Confirm Administered Dose 20 mg .ROUTE .STK-MED ONE


   Stop: 09/11/17 12:15


Fentanyl (Sublimaze) Confirm Administered Dose 100 mcg .ROUTE .STK-MED ONE


   Stop: 09/11/17 11:24


Haloperidol Lactate (Haldol)  1 mg IVPUSH ONETIME ONE


   Stop: 09/11/17 14:16


   Last Admin: 09/11/17 18:56 Dose:  Not Given


Lactated Ringer's (Ringers, Lactated)  1,000 mls @ 125 mls/hr IV ASDIRECTED [facility] hospital


   Last Admin: 09/11/17 11:10 Dose:  125 mls/hr


Cefazolin Sodium/Dextrose 2 gm (/ Premix)  50 mls @ 100 mls/hr IV Q8H [facility]


   Stop: 09/12/17 13:29


   Last Admin: 09/12/17 12:08 Dose:  100 mls/hr


Lidocaine HCl (Xylocaine-Mpf 1%) Confirm Administered Dose 4 mls @ as directed 

.ROUTE .STK-MED ONE


   Stop: 09/11/17 11:23


Iodine (Iodine 2% Mild Tincture) Confirm Administered Dose 30 ml .ROUTE .STK-

MED ONE


   Stop: 09/11/17 12:20


   Last Admin: 09/11/17 14:13 Dose:  18 ml


Ketorolac Tromethamine (Toradol)  15 mg IVPUSH Q8H PRN


   PRN Reason: Pain


   Last Admin: 09/12/17 21:57 Dose:  15 mg


Lidocaine/Sodium Bicarbonate (Buffered Lidocaine 1% In Ns 8.4%)  0.25 ml IV 

ONETIME PRN


   PRN Reason: Prior to IV Start


   Stop: 09/11/17 23:00


   Last Admin: 09/11/17 11:09 Dose:  0.25 ml


Midazolam HCl (Versed 1 Mg/Ml) Confirm Administered Dose 2 mg .ROUTE .STK-MED 

ONE


   Stop: 09/11/17 11:24


Midazolam HCl (Versed 1 Mg/Ml) Confirm Administered Dose 2 mg .ROUTE .STK-MED 

ONE


   Stop: 09/11/17 13:01


Morphine Sulfate (Duramorph Pf) Confirm Administered Dose 10 mg .ROUTE .STK-MED 

ONE


   Stop: 09/11/17 11:24


Ondansetron HCl (Zofran)  4 mg IVPUSH ONETIME PRN


   PRN Reason: Nausea/Vomiting


   Stop: 09/11/17 18:00


Propofol (Diprivan  20 Ml) Confirm Administered Dose 400 mg .ROUTE .STK-MED ONE


   Stop: 09/11/17 11:24


Propofol (Diprivan  20 Ml) Confirm Administered Dose 400 mg .ROUTE .STK-MED ONE


   Stop: 09/11/17 14:10


Scopolamine (Transderm-Scop)  1.5 mg TRDERM ONETIME ONE


   Stop: 09/11/17 12:31


   Last Admin: 09/11/17 12:30 Dose:  1.5 mg


Sodium Chloride (Saline Flush)  10 ml FLUSH ASDIRECTED PRN


   PRN Reason: Keep Vein Open


   Stop: 09/11/17 23:00


Tranexamic Acid (Cyklokapron) Confirm Administered Dose 1,000 mg .ROUTE .STK-

MED ONE


   Stop: 09/11/17 12:20


   Last Admin: 09/11/17 14:31 Dose:  1,000 mg


Vancomycin HCl (Vancomycin) Confirm Administered Dose 1 gm .ROUTE .STK-MED ONE


   Stop: 09/11/17 12:20


   Last Admin: 09/11/17 14:27 Dose:  1 gm











*Q Meaningful Use (DIS)





- VTE *Q


VTE Criteria *Q: 








- Stroke *Q


Stroke Criteria *Q: 








- AMI *Q


AMI Criteria *Q:
Can follow up with provider at his residence.